# Patient Record
Sex: FEMALE | Race: WHITE | NOT HISPANIC OR LATINO | Employment: OTHER | URBAN - METROPOLITAN AREA
[De-identification: names, ages, dates, MRNs, and addresses within clinical notes are randomized per-mention and may not be internally consistent; named-entity substitution may affect disease eponyms.]

---

## 2017-04-10 ENCOUNTER — TRANSCRIBE ORDERS (OUTPATIENT)
Dept: ADMINISTRATIVE | Facility: HOSPITAL | Age: 67
End: 2017-04-10

## 2017-04-10 DIAGNOSIS — Z12.31 VISIT FOR SCREENING MAMMOGRAM: Primary | ICD-10-CM

## 2017-05-10 ENCOUNTER — HOSPITAL ENCOUNTER (OUTPATIENT)
Dept: MAMMOGRAPHY | Facility: HOSPITAL | Age: 67
Discharge: HOME | End: 2017-05-10
Payer: MEDICARE

## 2017-05-10 DIAGNOSIS — Z12.31 VISIT FOR SCREENING MAMMOGRAM: ICD-10-CM

## 2017-05-10 PROCEDURE — G0202 SCR MAMMO BI INCL CAD: HCPCS

## 2018-04-23 ENCOUNTER — TRANSCRIBE ORDERS (OUTPATIENT)
Dept: ADMINISTRATIVE | Facility: HOSPITAL | Age: 68
End: 2018-04-23

## 2018-04-23 DIAGNOSIS — Z12.39 SCREENING BREAST EXAMINATION: Primary | ICD-10-CM

## 2018-05-14 ENCOUNTER — HOSPITAL ENCOUNTER (OUTPATIENT)
Dept: MAMMOGRAPHY | Facility: HOSPITAL | Age: 68
Discharge: HOME | End: 2018-05-14
Payer: MEDICARE

## 2018-05-14 DIAGNOSIS — Z12.39 SCREENING BREAST EXAMINATION: ICD-10-CM

## 2018-05-14 PROCEDURE — 77067 SCR MAMMO BI INCL CAD: CPT

## 2019-05-01 ENCOUNTER — TRANSCRIBE ORDERS (OUTPATIENT)
Dept: ADMINISTRATIVE | Facility: HOSPITAL | Age: 69
End: 2019-05-01

## 2019-05-01 DIAGNOSIS — Z12.39 BREAST SCREENING, UNSPECIFIED: Primary | ICD-10-CM

## 2019-05-15 ENCOUNTER — HOSPITAL ENCOUNTER (OUTPATIENT)
Dept: MAMMOGRAPHY | Facility: HOSPITAL | Age: 69
Discharge: HOME | End: 2019-05-15
Payer: MEDICARE

## 2019-05-15 VITALS — HEIGHT: 60 IN | WEIGHT: 106 LBS | BODY MASS INDEX: 20.81 KG/M2

## 2019-05-15 DIAGNOSIS — Z12.39 BREAST SCREENING, UNSPECIFIED: ICD-10-CM

## 2019-05-15 PROCEDURE — 77067 SCR MAMMO BI INCL CAD: CPT

## 2019-09-18 ENCOUNTER — OFFICE VISIT (OUTPATIENT)
Dept: PODIATRY | Facility: CLINIC | Age: 69
End: 2019-09-18
Payer: MEDICARE

## 2019-09-18 VITALS
DIASTOLIC BLOOD PRESSURE: 70 MMHG | HEIGHT: 60 IN | BODY MASS INDEX: 21.68 KG/M2 | WEIGHT: 110.4 LBS | SYSTOLIC BLOOD PRESSURE: 112 MMHG

## 2019-09-18 DIAGNOSIS — M79.671 RIGHT FOOT PAIN: ICD-10-CM

## 2019-09-18 DIAGNOSIS — G57.61 LESION OF RIGHT PLANTAR NERVE: Primary | ICD-10-CM

## 2019-09-18 PROCEDURE — 64455 NJX AA&/STRD PLTR COM DG NRV: CPT | Performed by: PODIATRIST

## 2019-09-18 PROCEDURE — 99213 OFFICE O/P EST LOW 20 MIN: CPT | Performed by: PODIATRIST

## 2019-09-18 RX ORDER — LIDOCAINE HYDROCHLORIDE 20 MG/ML
1 INJECTION, SOLUTION INFILTRATION; PERINEURAL ONCE
Status: COMPLETED | OUTPATIENT
Start: 2019-09-18 | End: 2019-09-18

## 2019-09-18 RX ORDER — LEVOTHYROXINE SODIUM 0.07 MG/1
75 TABLET ORAL DAILY
Refills: 11 | COMMUNITY
Start: 2019-08-27 | End: 2021-11-03

## 2019-09-18 RX ORDER — GABAPENTIN 300 MG/1
CAPSULE ORAL 3 TIMES DAILY
COMMUNITY
End: 2019-10-16 | Stop reason: SDUPTHER

## 2019-09-18 RX ORDER — FAMOTIDINE 20 MG/1
20 TABLET, FILM COATED ORAL 2 TIMES DAILY
Refills: 5 | COMMUNITY
Start: 2019-07-30

## 2019-09-18 RX ORDER — TRIAMCINOLONE ACETONIDE 40 MG/ML
20 INJECTION, SUSPENSION INTRA-ARTICULAR; INTRAMUSCULAR ONCE
Status: COMPLETED | OUTPATIENT
Start: 2019-09-18 | End: 2019-09-18

## 2019-09-18 RX ORDER — FLUTICASONE PROPIONATE 50 MCG
SPRAY, SUSPENSION (ML) NASAL
Refills: 5 | COMMUNITY
Start: 2019-09-06

## 2019-09-18 RX ORDER — METHOCARBAMOL 500 MG/1
TABLET, FILM COATED ORAL DAILY
COMMUNITY
Start: 2017-10-25

## 2019-09-18 RX ORDER — MELOXICAM 7.5 MG/1
TABLET ORAL
COMMUNITY
End: 2019-10-16 | Stop reason: SDUPTHER

## 2019-09-18 RX ADMIN — TRIAMCINOLONE ACETONIDE 20 MG: 40 INJECTION, SUSPENSION INTRA-ARTICULAR; INTRAMUSCULAR at 17:14

## 2019-09-18 RX ADMIN — LIDOCAINE HYDROCHLORIDE 1 ML: 20 INJECTION, SOLUTION INFILTRATION; PERINEURAL at 17:12

## 2019-09-18 NOTE — PROGRESS NOTES
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 although Isadore Apo sign is negative  Discussed treatment options  Injected 3rd metatarsal interspace right foot with 0 5 cc Kenalog 40 along with 1 cc 1% xylocaine  Patient is rescheduled in 3 weeks  Nerve block  Date/Time: 9/18/2019 5:18 PM  Performed by: Janey Aviles DPM  Authorized by: Janey Aviles DPM     Patient location:  Clinic  Other Assisting Provider: No    Consent:     Consent obtained:  Verbal    Consent given by:  Patient    Risks discussed:  Unsuccessful block and pain  Universal protocol:     Procedure explained and questions answered to patient or proxy's satisfaction: yes      Patient identity confirmed:  Verbally with patient  Indications:     Indications:  Pain relief  Location:     Body area:  Lower extremity    Lower extremity nerve:  Digital    Laterality:  Right  Pre-procedure details:     Skin preparation:  Alcohol  Procedure details (see MAR for exact dosages): Block needle gauge:  25 G    Anesthetic injected:  Lidocaine 2% w/o epi    Steroid injected:  Triamcinolone    Injection procedure:  Anatomic landmarks identified  Post-procedure details:     Dressing:  None    Outcome:  Pain relieved    Patient tolerance of procedure: Tolerated well, no immediate complications          No problem-specific Assessment & Plan notes found for this encounter  Diagnoses and all orders for this visit:    Lesion of right plantar nerve  -     lidocaine (XYLOCAINE) 2 % injection 1 mL  -     triamcinolone acetonide (KENALOG-40) 40 mg/mL injection 20 mg    Right foot pain    Other orders  -     levothyroxine 75 mcg tablet;  Take 75 mcg by mouth daily  -     meloxicam (MOBIC) 7 5 mg tablet; meloxicam 7 5 mg tablet  -     gabapentin (NEURONTIN) 300 mg capsule; 3 (three) times a day   -     fluticasone (FLONASE) 50 mcg/act nasal spray; SPRAY 2 SPRAYS BY INTRANASAL ROUTE EVERY DAY IN EACH NOSTRIL  - famotidine (PEPCID) 20 mg tablet; Take 20 mg by mouth 2 (two) times a day  -     methocarbamol (ROBAXIN) 500 mg tablet; Take by mouth Daily          Subjective:      Patient ID: Suze Howe is a 78 y o  female  HPI     Patient presents with pain in the ball of the right foot and 4th toe for the past 1 week  Patient states that when he 1st developed the pain was severe  Now it is more comfortable but still is difficult  Pain present only when she walks  She relates a peculiar sensation in the 4th toe  She recalls no trauma  No left foot discomfort related  The following portions of the patient's history were reviewed and updated as appropriate: allergies, current medications, past family history, past medical history, past social history, past surgical history and problem list     Review of Systems   Constitutional: Negative  Cardiovascular: Negative  Gastrointestinal: Negative  Musculoskeletal: Negative  Neurological: Negative  Objective:      /70   Ht 5' (1 524 m)   Wt 50 1 kg (110 lb 6 4 oz)   BMI 21 56 kg/m²          Physical Exam   Cardiovascular: Regular rhythm and intact distal pulses  Musculoskeletal: She exhibits tenderness  Pain with palpation 3rd metatarsal interspace right foot and 4th metatarsophalangeal joint right foot  Patient can raise on to tip toes without difficulty  Mild discomfort with palpation of the 4th ray  Neurological: No sensory deficit  She exhibits abnormal muscle tone  Radha sign negative at the 3rd metatarsal interspace right foot   Skin: Skin is warm  No rash noted  No erythema

## 2019-10-14 ENCOUNTER — TELEPHONE (OUTPATIENT)
Dept: PODIATRY | Facility: CLINIC | Age: 69
End: 2019-10-14

## 2019-10-14 NOTE — TELEPHONE ENCOUNTER
I received a call from [facility] requesting the treatment notes from [facility] 9/18/19 appointment      I faxed them to [phone]

## 2019-10-16 ENCOUNTER — OFFICE VISIT (OUTPATIENT)
Dept: PODIATRY | Facility: CLINIC | Age: 69
End: 2019-10-16
Payer: MEDICARE

## 2019-10-16 VITALS
HEART RATE: 75 BPM | WEIGHT: 104 LBS | HEIGHT: 60 IN | SYSTOLIC BLOOD PRESSURE: 113 MMHG | BODY MASS INDEX: 20.42 KG/M2 | DIASTOLIC BLOOD PRESSURE: 71 MMHG

## 2019-10-16 DIAGNOSIS — G57.61 LESION OF RIGHT PLANTAR NERVE: Primary | ICD-10-CM

## 2019-10-16 PROCEDURE — 99212 OFFICE O/P EST SF 10 MIN: CPT | Performed by: PODIATRIST

## 2019-10-16 RX ORDER — GABAPENTIN 100 MG/1
CAPSULE ORAL 3 TIMES DAILY
Refills: 2 | COMMUNITY
Start: 2019-09-26

## 2019-10-16 RX ORDER — MELOXICAM 15 MG/1
15 TABLET ORAL DAILY
Refills: 2 | COMMUNITY
Start: 2019-10-05

## 2019-10-16 NOTE — PROGRESS NOTES
Patient presents for assessment of right foot  Patient responded well to cortisone injection at 3rd metatarsal interspace right foot  She has minimal pain at this time  No additional treatment is needed  Reappoint p r n

## 2020-05-26 ENCOUNTER — TRANSCRIBE ORDERS (OUTPATIENT)
Dept: ADMINISTRATIVE | Facility: HOSPITAL | Age: 70
End: 2020-05-26

## 2020-05-26 DIAGNOSIS — Z12.31 SCREENING MAMMOGRAM, ENCOUNTER FOR: Primary | ICD-10-CM

## 2020-07-01 ENCOUNTER — HOSPITAL ENCOUNTER (OUTPATIENT)
Dept: MAMMOGRAPHY | Facility: MEDICAL CENTER | Age: 70
Discharge: HOME | End: 2020-07-01
Payer: MEDICARE

## 2020-07-01 VITALS — WEIGHT: 95 LBS | HEIGHT: 60 IN | BODY MASS INDEX: 18.65 KG/M2

## 2020-07-01 DIAGNOSIS — Z12.31 SCREENING MAMMOGRAM, ENCOUNTER FOR: ICD-10-CM

## 2020-07-01 PROCEDURE — 77067 SCR MAMMO BI INCL CAD: CPT

## 2020-07-01 PROCEDURE — 77063 BREAST TOMOSYNTHESIS BI: CPT

## 2021-03-10 DIAGNOSIS — Z23 ENCOUNTER FOR IMMUNIZATION: ICD-10-CM

## 2021-05-20 ENCOUNTER — TRANSCRIBE ORDERS (OUTPATIENT)
Dept: ADMINISTRATIVE | Facility: HOSPITAL | Age: 71
End: 2021-05-20

## 2021-05-20 DIAGNOSIS — Z12.31 ENCOUNTER FOR SCREENING MAMMOGRAM FOR MALIGNANT NEOPLASM OF BREAST: Primary | ICD-10-CM

## 2021-07-21 ENCOUNTER — HOSPITAL ENCOUNTER (OUTPATIENT)
Dept: MAMMOGRAPHY | Facility: MEDICAL CENTER | Age: 71
Discharge: HOME | End: 2021-07-21
Payer: MEDICARE

## 2021-07-21 VITALS — HEIGHT: 59 IN | WEIGHT: 95 LBS | BODY MASS INDEX: 19.15 KG/M2

## 2021-07-21 DIAGNOSIS — Z12.31 ENCOUNTER FOR SCREENING MAMMOGRAM FOR MALIGNANT NEOPLASM OF BREAST: ICD-10-CM

## 2021-07-21 PROCEDURE — 77067 SCR MAMMO BI INCL CAD: CPT

## 2021-07-21 PROCEDURE — 77063 BREAST TOMOSYNTHESIS BI: CPT

## 2021-11-03 ENCOUNTER — OFFICE VISIT (OUTPATIENT)
Dept: PODIATRY | Facility: CLINIC | Age: 71
End: 2021-11-03
Payer: MEDICARE

## 2021-11-03 VITALS
BODY MASS INDEX: 20.56 KG/M2 | HEIGHT: 59 IN | WEIGHT: 102 LBS | DIASTOLIC BLOOD PRESSURE: 56 MMHG | SYSTOLIC BLOOD PRESSURE: 114 MMHG

## 2021-11-03 DIAGNOSIS — M79.672 LEFT FOOT PAIN: ICD-10-CM

## 2021-11-03 DIAGNOSIS — L85.2 KERATODERMA PUNCTATA: Primary | ICD-10-CM

## 2021-11-03 PROCEDURE — 99212 OFFICE O/P EST SF 10 MIN: CPT | Performed by: PODIATRIST

## 2021-11-03 RX ORDER — ACETAMINOPHEN 500 MG
TABLET ORAL EVERY 6 HOURS
COMMUNITY
Start: 2021-05-20

## 2021-11-03 RX ORDER — ROSUVASTATIN CALCIUM 5 MG/1
TABLET, COATED ORAL
COMMUNITY
Start: 2021-06-17

## 2021-11-03 RX ORDER — TURMERIC 400 MG
CAPSULE ORAL
COMMUNITY

## 2021-11-03 RX ORDER — LEVOTHYROXINE SODIUM 0.05 MG/1
TABLET ORAL
COMMUNITY
Start: 2021-05-20

## 2021-11-03 RX ORDER — PREDNISONE 10 MG/1
TABLET ORAL
COMMUNITY
Start: 2021-10-27

## 2021-11-03 RX ORDER — AMLODIPINE BESYLATE 2.5 MG/1
2.5 TABLET ORAL DAILY
COMMUNITY
Start: 2021-10-13

## 2021-12-01 ENCOUNTER — OFFICE VISIT (OUTPATIENT)
Dept: PODIATRY | Facility: CLINIC | Age: 71
End: 2021-12-01
Payer: MEDICARE

## 2021-12-01 VITALS
SYSTOLIC BLOOD PRESSURE: 108 MMHG | HEIGHT: 59 IN | BODY MASS INDEX: 20.76 KG/M2 | WEIGHT: 103 LBS | DIASTOLIC BLOOD PRESSURE: 56 MMHG

## 2021-12-01 DIAGNOSIS — M79.672 LEFT FOOT PAIN: ICD-10-CM

## 2021-12-01 DIAGNOSIS — L85.2 KERATODERMA PUNCTATA: Primary | ICD-10-CM

## 2021-12-01 PROCEDURE — 99212 OFFICE O/P EST SF 10 MIN: CPT | Performed by: PODIATRIST

## 2021-12-01 RX ORDER — SALICYLIC ACID 60 MG/G
GEL TOPICAL
COMMUNITY
Start: 2021-09-07

## 2022-05-04 ENCOUNTER — OFFICE VISIT (OUTPATIENT)
Dept: PODIATRY | Facility: CLINIC | Age: 72
End: 2022-05-04
Payer: MEDICARE

## 2022-05-04 VITALS
SYSTOLIC BLOOD PRESSURE: 104 MMHG | HEIGHT: 59 IN | WEIGHT: 103 LBS | DIASTOLIC BLOOD PRESSURE: 60 MMHG | BODY MASS INDEX: 20.76 KG/M2

## 2022-05-04 DIAGNOSIS — M67.471 GANGLION CYST OF RIGHT FOOT: Primary | ICD-10-CM

## 2022-05-04 DIAGNOSIS — M79.671 RIGHT FOOT PAIN: ICD-10-CM

## 2022-05-04 PROCEDURE — 20550 NJX 1 TENDON SHEATH/LIGAMENT: CPT | Performed by: PODIATRIST

## 2022-05-04 PROCEDURE — 99213 OFFICE O/P EST LOW 20 MIN: CPT | Performed by: PODIATRIST

## 2022-05-04 RX ORDER — TRIAMCINOLONE ACETONIDE 40 MG/ML
20 INJECTION, SUSPENSION INTRA-ARTICULAR; INTRAMUSCULAR ONCE
Status: COMPLETED | OUTPATIENT
Start: 2022-05-04 | End: 2022-05-04

## 2022-05-04 RX ORDER — LIDOCAINE HYDROCHLORIDE 10 MG/ML
1 INJECTION, SOLUTION EPIDURAL; INFILTRATION; INTRACAUDAL; PERINEURAL ONCE
Status: COMPLETED | OUTPATIENT
Start: 2022-05-04 | End: 2022-05-04

## 2022-05-04 RX ADMIN — LIDOCAINE HYDROCHLORIDE 1 ML: 10 INJECTION, SOLUTION EPIDURAL; INFILTRATION; INTRACAUDAL; PERINEURAL at 14:42

## 2022-05-04 RX ADMIN — TRIAMCINOLONE ACETONIDE 20 MG: 40 INJECTION, SUSPENSION INTRA-ARTICULAR; INTRAMUSCULAR at 14:42

## 2022-05-04 NOTE — PROGRESS NOTES
Assessment/Plan:    Explained to patient that she is dealing with a ganglion cyst dorsum of the right foot  Recommended aspiration and then cortisone injection  Anesthesia via 3 cc 1% xylocaine to numb about the cyst   Then we were able to aspirate 0 5 cc of gelatinous material consistent with a ganglion  Injected 0 5 cc Kenalog 40 into the cyst to try to lead to resolution  Reappoint 4 weeks  No problem-specific Assessment & Plan notes found for this encounter  Diagnoses and all orders for this visit:    Ganglion cyst of right foot  -     lidocaine (PF) (XYLOCAINE-MPF) 1 % injection 1 mL  -     triamcinolone acetonide (KENALOG-40) 40 mg/mL injection 20 mg    Right foot pain          Subjective:      Patient ID: Alka Loyd is a 67 y o  female  HPI     Patient, a 69-year-old female seen as an emergency  Patient developed significant pain and swelling on the dorsum of the right foot approximately 3 days ago  She recalls no trauma  No left foot discomfort related  The following portions of the patient's history were reviewed and updated as appropriate: allergies, current medications, past family history, past medical history, past social history, past surgical history and problem list     Review of Systems   Gastrointestinal: Negative  Musculoskeletal: Positive for gait problem  Neurological: Negative for numbness  Psychiatric/Behavioral: Negative  Objective:      /60   Ht 4' 11" (1 499 m)   Wt 46 7 kg (103 lb)   BMI 20 80 kg/m²          Physical Exam  Constitutional:       Appearance: Normal appearance  Cardiovascular:      Pulses: Normal pulses  Musculoskeletal:         General: Tenderness and deformity present  Comments: Palpable soft tissue mass present at the 2nd metatarsal cuneiform joint right foot  This is consistent with a ganglion cyst    Skin:     General: Skin is warm  Neurological:      General: No focal deficit present        Mental Status: She is oriented to person, place, and time  Foot injection     Date/Time 5/4/2022 2:50 PM     Performed by  Tevin Mills DPM     Authorized by Tevin Mills DPM      Universal Protocol   Consent: Verbal consent obtained  Risks and benefits: risks, benefits and alternatives were discussed  Consent given by: patient  Patient understanding: patient states understanding of the procedure being performed  Patient identity confirmed: verbally with patient        Local anesthesia used: yes     Anesthesia   Local anesthesia used: yes  Local Anesthetic: lidocaine 1% without epinephrine     Procedure Details   Procedure Notes: Injected 3 cc of 1% xylocaine and 0 5 cc Kenalog 40 to the right foot

## 2022-06-08 ENCOUNTER — OFFICE VISIT (OUTPATIENT)
Dept: PODIATRY | Facility: CLINIC | Age: 72
End: 2022-06-08
Payer: MEDICARE

## 2022-06-08 VITALS
WEIGHT: 103 LBS | HEART RATE: 60 BPM | DIASTOLIC BLOOD PRESSURE: 56 MMHG | BODY MASS INDEX: 20.76 KG/M2 | HEIGHT: 59 IN | SYSTOLIC BLOOD PRESSURE: 114 MMHG

## 2022-06-08 DIAGNOSIS — M67.471 GANGLION CYST OF RIGHT FOOT: Primary | ICD-10-CM

## 2022-06-08 PROCEDURE — 99212 OFFICE O/P EST SF 10 MIN: CPT | Performed by: PODIATRIST

## 2022-06-08 NOTE — PROGRESS NOTES
Patient presents for assessment of right foot  Patient had ganglion aspirated from the dorsum of the foot at the 2nd metatarsocuneiform joint at last visit  Patient is comfortable and there has been no cyst recurrence  No additional treatment needed  Darell p r n

## 2022-09-08 ENCOUNTER — HOSPITAL ENCOUNTER (OUTPATIENT)
Dept: MAMMOGRAPHY | Facility: MEDICAL CENTER | Age: 72
Discharge: HOME | End: 2022-09-08
Payer: MEDICARE

## 2022-09-08 VITALS — BODY MASS INDEX: 20.76 KG/M2 | HEIGHT: 59 IN | WEIGHT: 102.95 LBS

## 2022-09-08 DIAGNOSIS — Z12.31 ENCOUNTER FOR SCREENING MAMMOGRAM FOR MALIGNANT NEOPLASM OF BREAST: ICD-10-CM

## 2022-09-08 PROCEDURE — 77067 SCR MAMMO BI INCL CAD: CPT

## 2022-09-08 PROCEDURE — 77063 BREAST TOMOSYNTHESIS BI: CPT

## 2022-09-14 ENCOUNTER — OFFICE VISIT (OUTPATIENT)
Dept: PODIATRY | Facility: CLINIC | Age: 72
End: 2022-09-14
Payer: MEDICARE

## 2022-09-14 VITALS
HEIGHT: 59 IN | DIASTOLIC BLOOD PRESSURE: 54 MMHG | SYSTOLIC BLOOD PRESSURE: 110 MMHG | BODY MASS INDEX: 20.56 KG/M2 | WEIGHT: 102 LBS

## 2022-09-14 DIAGNOSIS — M79.671 RIGHT FOOT PAIN: ICD-10-CM

## 2022-09-14 DIAGNOSIS — M67.471 GANGLION CYST OF RIGHT FOOT: Primary | ICD-10-CM

## 2022-09-14 PROCEDURE — 20550 NJX 1 TENDON SHEATH/LIGAMENT: CPT | Performed by: PODIATRIST

## 2022-09-14 RX ORDER — LIDOCAINE HYDROCHLORIDE 10 MG/ML
1 INJECTION, SOLUTION EPIDURAL; INFILTRATION; INTRACAUDAL; PERINEURAL ONCE
Status: COMPLETED | OUTPATIENT
Start: 2022-09-14 | End: 2022-09-14

## 2022-09-14 RX ORDER — TRIAMCINOLONE ACETONIDE 40 MG/ML
20 INJECTION, SUSPENSION INTRA-ARTICULAR; INTRAMUSCULAR ONCE
Status: COMPLETED | OUTPATIENT
Start: 2022-09-14 | End: 2022-09-14

## 2022-09-14 RX ADMIN — TRIAMCINOLONE ACETONIDE 20 MG: 40 INJECTION, SUSPENSION INTRA-ARTICULAR; INTRAMUSCULAR at 16:03

## 2022-09-14 RX ADMIN — LIDOCAINE HYDROCHLORIDE 1 ML: 10 INJECTION, SOLUTION EPIDURAL; INFILTRATION; INTRACAUDAL; PERINEURAL at 16:02

## 2022-09-14 NOTE — PROGRESS NOTES
Patient presents with recurrence of pain dorsal right foot at site of prior ganglion cyst   This is at the 1st metatarsal interspace  The cyst has not recurred but patient again having pain in this area that radiates distally  Recommended a 2nd cortisone injection  Last injection was approximately 3 months ago  Treatment:  Injected right foot with 0 5 cc Kenalog 40 along with 1 cc 1% xylocaine  Reappoint 4 weeks  Foot injection     Date/Time 9/14/2022 3:59 PM     Performed by  Cece Campbell DPM     Authorized by Cece Campbell DPM      Universal Protocol   Consent: Verbal consent obtained  Risks and benefits: risks, benefits and alternatives were discussed  Consent given by: patient  Patient understanding: patient states understanding of the procedure being performed  Patient identity confirmed: verbally with patient        Local anesthesia used: yes     Anesthesia   Local anesthesia used: yes  Local Anesthetic: lidocaine 1% without epinephrine     Procedure Details   Procedure Notes: Injected right foot with 0 5 cc Kenalog 40 along with 1 cc 1% xylocaine

## 2022-10-19 ENCOUNTER — OFFICE VISIT (OUTPATIENT)
Dept: PODIATRY | Facility: CLINIC | Age: 72
End: 2022-10-19
Payer: MEDICARE

## 2022-10-19 VITALS
HEIGHT: 59 IN | DIASTOLIC BLOOD PRESSURE: 60 MMHG | SYSTOLIC BLOOD PRESSURE: 110 MMHG | BODY MASS INDEX: 20.76 KG/M2 | WEIGHT: 103 LBS

## 2022-10-19 DIAGNOSIS — M79.671 RIGHT FOOT PAIN: ICD-10-CM

## 2022-10-19 DIAGNOSIS — M67.471 GANGLION CYST OF RIGHT FOOT: Primary | ICD-10-CM

## 2022-10-19 PROCEDURE — 99212 OFFICE O/P EST SF 10 MIN: CPT | Performed by: PODIATRIST

## 2022-10-19 NOTE — PROGRESS NOTES
Patient presents for assessment of her right foot  Patient was given cortisone injection last visit due to pain  This discomfort has resolved and no pain is currently related  There is no evidence of ganglion cyst   Reappoint p r n

## 2022-12-21 ENCOUNTER — EVALUATION (OUTPATIENT)
Dept: PHYSICAL THERAPY | Facility: REHABILITATION | Age: 72
End: 2022-12-21

## 2022-12-21 DIAGNOSIS — R42 VERTIGO: Primary | ICD-10-CM

## 2022-12-21 NOTE — PROGRESS NOTES
PHYSICAL THERAPY EVALUATION     Date: 22  Name: Stanislav Vargas  : 1950  Referring Provider: Teresa Platt DO  AUTHORIZATION:   Insurance: Payor: Sharon Pandey / Plan: MEDICARE A AND B / Product Type: Medicare A & B Fee for Service /     SUBJECTIVE:  HPI: Stanislav Vargas is a 67 y o  female referred to outpatient physical therapy for the following diagnosis No diagnosis found  Patient reports intermittent vertigo with leaning to the right, feels immediate tilt a whirl  Hasn't happened in past two days but it comes and goes  Can happen with bending forwards  Never happens to the left  Sometimes happens if rolling in bed to the right  Feels rolling/spinning  Began 2 months ago at least, no specific illness or injury  No medication  Takes medication for pain among other areas  Denies right-sided ear fullness, feels water in left side  Doesn't go away, has occurred intermittently for a long time  Ringing in both ears, not currently  Sometimes sounds loud  For a few years  Unsure about cause  [place] in yard at night caring for puppy, over the past year, no injury, can trip and fall over anything, no injuries  Patient-Specific Functional Scale   Task is scored 0 (unable to perform activity) to 10 (ability to perform activity independently)  Activity     1   Make sure the right ear is in better shape, get rid of it     2         3           Past Medical History:   Diagnosis Date   • Allergic rhinitis    • Arthritis    • Callus    • Disease of thyroid gland    • Hammer toe    • Hyperthyroidism    • Keratosis    • Metatarsalgia        Current Outpatient Medications:   •  acetaminophen (TYLENOL) 500 mg tablet, Take by mouth every 6 (six) hours, Disp: , Rfl:   •  amLODIPine (NORVASC) 2 5 mg tablet, Take 2 5 mg by mouth daily (Patient not taking: No sig reported), Disp: , Rfl:   •  famotidine (PEPCID) 20 mg tablet, Take 20 mg by mouth 2 (two) times a day, Disp: , Rfl: 5  •  fluticasone (FLONASE) 50 mcg/act nasal spray, SPRAY 2 SPRAYS BY INTRANASAL ROUTE EVERY DAY IN EACH NOSTRIL, Disp: , Rfl: 5  •  gabapentin (NEURONTIN) 100 mg capsule, 3 (three) times a day , Disp: , Rfl: 2  •  levothyroxine 50 mcg tablet, TAKE ONE TABLET BY ORAL ROUTE EVERY DAY, Disp: , Rfl:   •  meloxicam (MOBIC) 15 mg tablet, Take 15 mg by mouth daily Take with food (Patient not taking: No sig reported), Disp: , Rfl: 2  •  methocarbamol (ROBAXIN) 500 mg tablet, Take by mouth Daily (Patient not taking: No sig reported), Disp: , Rfl:   •  predniSONE 10 mg tablet, Take 4 tabs PO qd x 2 days, 3 tabs qd x 2 days, 2 tabs qd x 2 days, 1 tab qd x 2 days (Patient not taking: No sig reported), Disp: , Rfl:   •  rosuvastatin (CRESTOR) 5 mg tablet, take 1 tablet by oral route  MW, Disp: , Rfl:   •  salicylic acid 6 % gel, APPLY TWICE DAILY TO THE THICKENED AREAS OF THE SKIN ON ELBOWS AND KNEES, Disp: , Rfl:   •  Turmeric 400 MG CAPS, Take by mouth, Disp: , Rfl:     OBJECTIVE:   Vitals     Blood pressure (resting, left arm unless noted) 104/60    Heart rate (resting) 68 bpm       Oculomotor & Coordination     Smooth pursuit & conjugate gaze Normal; no resting nor gaze holding nystagmus    Fingertip to nose & rapidly alternating hand movements Normal   Neck ROM within functional limits  Static Balance     Romberg Normal      Functional Gait Assessment  2/3 Gait level surface  2/3 Change in gait speed  2/3 Gait with horizontal head turns  1/3 Gait with vertical head turns  3/3 Gait and pivot turn  3/3 Step over obstacle  0/3 Gait with narrow base of support  1/3 Gait with eyes closed  2/3 Ambulating backwards  1/3 Steps - Patient described, due to right hip bursitis, had injection with temporary relief last week  17/30 Total score (less than 22/30 indicates increased risk of fall)         Mobility Measures 12/21/22   5 Time Sit to Stand  (17" chair, arms across chest) 15 3 seconds   3 Meter Timed  Up & Go 9 0 seconds   Walking speed (self-selected) Not tested   Functional Gait Assessment (see below) 17/30   6 Minute Walk Test (100 foot circular course) Not tested   Patient-Reported Outcome Measure: Activities-Specific Balance Confidence Scale (ABC Scale) 85%     Dynamic Visual Acuity (uses ETDRS chart, head movements 2Hz):    Static Head 20/50 no glasses; 20/40 with    Dynamic Head Use 20/200   Difference (abnormal if 3 or greater) At least 7 line     (+) head thrust right and left    History of sacrum and arm fracture  Core Movement Tasks Description of task    Sitting Normal   Sitting to Standing Normal   Standing Normal   Walking Normal gait   Step-up Not observed   Reach, grasp, manipulate Within functional limits  ASSESSMENT:  Mathieu Hernandez is a 67year old female with vertigo, most consistent with likely right posterior canal BPPV as well as bilateral peripheral hypofunction  We were not able to elicit BPPV today during the examination  It does seem that likely bilateral vestibular hypofunction contributes to history of falling  SHORT-TERM GOALS: by 1/20/23  1  Patient denies symptoms consistent with positional vertigo and continues to demonstrate normal Cristiana-Hallpike and Roll Tests bilaterally  2  Patient demonstrates at most 6 line difference with dynamic visual acuity testing  3  Patient improves FGA to at least 20/30  LONG-TERM GOALS: by 2/20/23  1  Patient increases FGA to 22/30   2  Patient denies vertigo with return to all prior movements      Precautions - none    Specialty Treatment Diary  12/21/22     Home exercise program Seated VOR x 1 horizontal  15 sec x 4 calibrating on "retinal slip"  Walking horizontal head movements in bernardo at home, 15 sec x 4     (Ther-ex, neuromuscular re-ed)Walking/endurance      (Neuromuscular re-ed) Static and dynamic balance/anticipatory / reactive      (Ther-ex) Strengthening      (There-ex) Stretching              PLAN OF CARE:  Patient will benefit from physical therapy 1 times per week for 2 months  Neuromuscular re-education, therapeutic exercises, and therapeutic activities as outlined in grids      Desmond Plata, PT  12/21/2022 Meals and Snack Collaboration and Referral of Nutrition Care/Nutrition Education

## 2022-12-21 NOTE — LETTER
2022    Nolvia Castellano, [address] [facility]    Patient: Afua Madrigal   YOB: 1950   Date of Visit: 2022     Encounter Diagnosis     ICD-10-CM    1  Ashley Royal           Dear Dr Tivis Gilford: Thank you for your recent referral of Afua Madrigal  Please review the attached evaluation summary from Nishi's recent visit  Please verify that you agree with the plan of care by signing the attached order  If you have any questions or concerns, please do not hesitate to call  I sincerely appreciate the opportunity to share in the care of one of your patients and hope to have another opportunity to work with you in the near future  Sincerely,    Fransisca Ley, PT      Referring Provider:      I certify that I have read the below Plan of Care and certify the need for these services furnished under this plan of treatment while under my care  Nolvia Castellano DO  [address] [facility]  Via Fax: [phone]          PHYSICAL THERAPY EVALUATION     Date: 22  Name: Afua Madrigal  : 1950  Referring Provider: Meghan Mendez DO  AUTHORIZATION:   Insurance: Payor: MEDICARE / Plan: MEDICARE A AND B / Product Type: Medicare A & B Fee for Service /     SUBJECTIVE:  HPI: Afua Madrigal is a 67 y o  female referred to outpatient physical therapy for the following diagnosis No diagnosis found  Patient reports intermittent vertigo with leaning to the right, feels immediate tilt a whirl  Hasn't happened in past two days but it comes and goes  Can happen with bending forwards  Never happens to the left  Sometimes happens if rolling in bed to the right  Feels rolling/spinning  Began 2 months ago at least, no specific illness or injury  No medication  Takes medication for pain among other areas  Denies right-sided ear fullness, feels water in left side  Doesn't go away, has occurred intermittently for a long time  Ringing in both ears, not currently  Sometimes sounds loud  For a few years  Unsure about cause  [place] in yard at night caring for puppy, over the past year, no injury, can trip and fall over anything, no injuries  Patient-Specific Functional Scale   Task is scored 0 (unable to perform activity) to 10 (ability to perform activity independently)  Activity     1   Make sure the right ear is in better shape, get rid of it     2         3           Past Medical History:   Diagnosis Date   • Allergic rhinitis 1990   • Arthritis    • Callus 2020   • Disease of thyroid gland 1967   • Hammer toe 2000   • Hyperthyroidism    • Keratosis    • Metatarsalgia        Current Outpatient Medications:   •  acetaminophen (TYLENOL) 500 mg tablet, Take by mouth every 6 (six) hours, Disp: , Rfl:   •  amLODIPine (NORVASC) 2 5 mg tablet, Take 2 5 mg by mouth daily (Patient not taking: No sig reported), Disp: , Rfl:   •  famotidine (PEPCID) 20 mg tablet, Take 20 mg by mouth 2 (two) times a day, Disp: , Rfl: 5  •  fluticasone (FLONASE) 50 mcg/act nasal spray, SPRAY 2 SPRAYS BY INTRANASAL ROUTE EVERY DAY IN EACH NOSTRIL, Disp: , Rfl: 5  •  gabapentin (NEURONTIN) 100 mg capsule, 3 (three) times a day , Disp: , Rfl: 2  •  levothyroxine 50 mcg tablet, TAKE ONE TABLET BY ORAL ROUTE EVERY DAY, Disp: , Rfl:   •  meloxicam (MOBIC) 15 mg tablet, Take 15 mg by mouth daily Take with food (Patient not taking: No sig reported), Disp: , Rfl: 2  •  methocarbamol (ROBAXIN) 500 mg tablet, Take by mouth Daily (Patient not taking: No sig reported), Disp: , Rfl:   •  predniSONE 10 mg tablet, Take 4 tabs PO qd x 2 days, 3 tabs qd x 2 days, 2 tabs qd x 2 days, 1 tab qd x 2 days (Patient not taking: No sig reported), Disp: , Rfl:   •  rosuvastatin (CRESTOR) 5 mg tablet, take 1 tablet by oral route  MWFS, Disp: , Rfl:   •  salicylic acid 6 % gel, APPLY TWICE DAILY TO THE THICKENED AREAS OF THE SKIN ON ELBOWS AND KNEES, Disp: , Rfl:   •  Turmeric 400 MG CAPS, Take by mouth, Disp: , Rfl:     OBJECTIVE:   Vitals     Blood pressure (resting, left arm unless noted) 104/60    Heart rate (resting) 68 bpm       Oculomotor & Coordination     Smooth pursuit & conjugate gaze Normal; no resting nor gaze holding nystagmus    Fingertip to nose & rapidly alternating hand movements Normal   Neck ROM within functional limits  Static Balance     Romberg Normal      Functional Gait Assessment  2/3 Gait level surface  2/3 Change in gait speed  2/3 Gait with horizontal head turns  1/3 Gait with vertical head turns  3/3 Gait and pivot turn  3/3 Step over obstacle  0/3 Gait with narrow base of support  1/3 Gait with eyes closed  2/3 Ambulating backwards  1/3 Steps - Patient described, due to right hip bursitis, had injection with temporary relief last week  17/30 Total score (less than 22/30 indicates increased risk of fall)         Mobility Measures 12/21/22   5 Time Sit to Stand  (17" chair, arms across chest) 15 3 seconds   3 Meter Timed  Up & Go 9 0 seconds   Walking speed (self-selected) Not tested   Functional Gait Assessment (see below) 17/30   6 Minute Walk Test (100 foot circular course) Not tested   Patient-Reported Outcome Measure: Activities-Specific Balance Confidence Scale (ABC Scale) 85%     Dynamic Visual Acuity (uses ETDRS chart, head movements 2Hz):    Static Head 20/50 no glasses; 20/40 with    Dynamic Head Use 20/200   Difference (abnormal if 3 or greater) At least 7 line     (+) head thrust right and left    History of sacrum and arm fracture  Core Movement Tasks Description of task    Sitting Normal   Sitting to Standing Normal   Standing Normal   Walking Normal gait   Step-up Not observed   Reach, grasp, manipulate Within functional limits       ASSESSMENT:  Terell Cagle is a 67year old female with vertigo, most consistent with likely right posterior canal BPPV as well as bilateral peripheral hypofunction  We were not able to elicit BPPV today during the examination  It does seem that likely bilateral vestibular hypofunction contributes to history of falling  SHORT-TERM GOALS: by 1/20/23  1  Patient denies symptoms consistent with positional vertigo and continues to demonstrate normal [place]-Hallpike and Roll Tests bilaterally  2  Patient demonstrates at most 6 line difference with dynamic visual acuity testing  3  Patient improves FGA to at least 20/30  LONG-TERM GOALS: by 2/20/23  1  Patient increases FGA to 22/30   2  Patient denies vertigo with return to all prior movements  Precautions - none    Specialty Treatment Diary  12/21/22     Home exercise program Seated VOR x 1 horizontal  15 sec x 4 calibrating on "retinal slip"  Walking horizontal head movements in bernardo at home, 15 sec x 4     (Ther-ex, neuromuscular re-ed)Walking/endurance      (Neuromuscular re-ed) Static and dynamic balance/anticipatory / reactive      (Ther-ex) Strengthening      (There-ex) Stretching              PLAN OF CARE:  Patient will benefit from physical therapy 1 times per week for 2 months  Neuromuscular re-education, therapeutic exercises, and therapeutic activities as outlined in grids      Emma Bray, PT  12/21/2022

## 2022-12-29 ENCOUNTER — OFFICE VISIT (OUTPATIENT)
Dept: PHYSICAL THERAPY | Facility: REHABILITATION | Age: 72
End: 2022-12-29

## 2022-12-29 DIAGNOSIS — R42 VERTIGO: Primary | ICD-10-CM

## 2022-12-29 NOTE — PROGRESS NOTES
Daily Note     Today's date: 2022  Patient name: Roger Javier  : 1950  MRN: [id number]  Referring provider: Judy Coyle DO  Dx:   Encounter Diagnosis     ICD-10-CM    1  Vertigo  R42                      Subjective: Reports compliance with HEP  Continues with dizziness, not spinning, more balance lightheaded dizziness  Objective: See treatment diary below      Assessment: Initiated POC addressing deficits found on eval   SOLO utilized for safety and fall prevention due to balance deficits and this being pt's first session  Tolerated treatment well, no nausea or headaches, increased dizziness but able to work through  Issued and reviewed HEP, pt communicated understanding  Patient would benefit from continued PT      Plan: Continues with vestibular exercises from today increasing times with VORx1 as pt able        Precautions - none  Manuals                                                                                         Neuro Re-Ed                 Vestibular edu functions of vestibular system                VORx1, H/V standing Plain 2x30s ea                Walking with HTs, H/V Fwd 4x20'                VORcx, H/V standing With gait  4x20' ea                Walking with 360 turns Fwd EO 4x20'                Foam EC FT 3x30s' FA HTs H/V 2x30s ea                Tandem gait EO arms crossed 4x20'                Bwd with EC 4x20'                Ther Ex                                                                                                                                                                 Ther Activity                                                     Gait Training                                                     Modalities

## 2023-01-05 ENCOUNTER — OFFICE VISIT (OUTPATIENT)
Dept: PHYSICAL THERAPY | Facility: REHABILITATION | Age: 73
End: 2023-01-05

## 2023-01-05 DIAGNOSIS — R42 VERTIGO: Primary | ICD-10-CM

## 2023-01-05 NOTE — PROGRESS NOTES
TREATMENT NOTE    Today's date: 2023  Patient name: Maritza Mendez  : 1950  MRN: [id number]  Referring provider: Darrell Bedolla DO  Dx:   Encounter Diagnosis     ICD-10-CM    1  Vertigo  R42            Subjective:   Doing exercises, does get symptomatic with semi-tandem eyes closed, can continue if resting in between exercises  Objective: See treatment diary below    Functional Gait Assessment  2/3 Gait level surface  3/3 Change in gait speed  2/3 Gait with horizontal head turns  1/3 Gait with vertical head turns  1/3 Gait and pivot turn  3/3 Step over obstacle  1/3 Gait with narrow base of support  2/3 Gait with eyes closed  2/3 Ambulating backwards  1/3 Steps  18/30 Total score (less than 22/30 indicates increased risk of fall)         Assessment: We worked in the [id number] to safely challenge balance, and were able to challenge higher level dynamic balance today  Reviewed prior written static exercises, continue to work to tolerance and move to dynamic exercises as able and safe  We can always progress exercises to the level that they are challenging, but the point is working to what is necessary for normal mobility        Plan:     Precautions - none  Manuals 23                           Neuro Re-Ed     Vestibular edu functions of vestibular system Walking head movements:  1 min horizontal  1 min vertical  1 min diagonal x 2 sets    Walking catching/passing ball 1 min x 2 sets    Tandem walk 1 min  Step onto foam incline, head movements horizontal, off, 1 min  Then same EC on the foam with the head movements, 2 min  Lateral step to foam incline, 1 min each x 2 sets  Walking 360 degree turns 1 min    Standing on foam incline, catch bean bags, 1 min       VORx1, H/V standing Plain 2x30s ea 20 sec x 2 sets feet apart, together  Then semi-tandem 20 sec x 3 sets    Then on foam feet apart 30 sec x 3 sets  EC foam 30 sec x 2 sets   Walking with HTs, H/V Fwd 4x20'    VORcx, H/V standing With gait  4x20' ea    Walking with 360 turns Fwd EO 4x20'    Foam EC FT 3x30s' FA HTs H/V 2x30s ea    Tandem gait EO arms crossed 4x20'    Bwd with EC 4x20'    Ther Ex                                                     Ther Activity                 Gait Training                 Modalities

## 2023-01-12 ENCOUNTER — OFFICE VISIT (OUTPATIENT)
Dept: PHYSICAL THERAPY | Facility: REHABILITATION | Age: 73
End: 2023-01-12

## 2023-01-12 DIAGNOSIS — R42 VERTIGO: Primary | ICD-10-CM

## 2023-01-12 NOTE — PROGRESS NOTES
TREATMENT NOTE    Today's date: 2023  Patient name: Peri Negron  : 1950  MRN: [id number]  Referring provider: Kayla Duron DO  Dx:   Encounter Diagnosis     ICD-10-CM    1  Vertigo  R42            Subjective:   Been doing okay moving around, has been to busy to get to exercises   on Dizziness Handicap Inventory, reports imbalance with looking up  Objective: See treatment diary below    Functional Gait Assessment  3/3 Gait level surface  3/3 Change in gait speed  3/3 Gait with horizontal head turns  2/3 Gait with vertical head turns  3/3 Gait and pivot turn    Step over obstacle -not tested   Gait with narrow base of support - not tested  2/3 Gait with eyes closed  2/3 Ambulating backwards  /3 Steps - not tested   Total score (less than 22/30 indicates increased risk of fall)         Assessment: We worked in the [employer] to safely challenge balance, and were able to challenge higher level dynamic balance today  Completed aspects of FGA, scored higher on these items than previously, but didn't complete all items so cannot give overall score  Progress home exercises in intensity and amplitude as able  Addressed neck stiffness that affects willingness to move the neck quickly and capacity to complete exercises requiring lots of neck movement        Plan:     Precautions - none  Manuals 23                               Neuro Re-Ed      Vestibular edu functions of vestibular system Walking head movements:  1 min horizontal  1 min vertical  1 min diagonal x 2 sets    Walking catching/passing ball 1 min x 2 sets    Tandem walk 1 min  Step onto foam incline, head movements horizontal, off, 1 min  Then same EC on the foam with the head movements, 2 min  Lateral step to foam incline, 1 min each x 2 sets  Walking 360 degree turns 1 min    Standing on foam incline, catch bean bags, 1 min     Static balance  VOR x 1 horizontal with progressively decreasing base of support to semi-tandem, 30 sec x 5  Vertical, same parameters, 30 sec x 2 sets  AP weight shift, 1 min, then 2nd and 3rd sets 1 min x 2 sets eyes closed  Standing on foam, feet together, eyes closed, 1 min x 2 sets    Walk and turn 180 - 360 degrees in Solostep, 3 min    Step up and back foam incline, 1 min x 2  Same eyes closed 1 min    Supine neck tuck gentle traction, trigger point right upper trap and left neck sidebending stretch, 5 min   VORx1, H/V standing Plain 2x30s ea 20 sec x 2 sets feet apart, together  Then semi-tandem 20 sec x 3 sets    Then on foam feet apart 30 sec x 3 sets  EC foam 30 sec x 2 sets    Walking with HTs, H/V Fwd 4x20'     VORcx, H/V standing With gait  4x20' ea     Walking with 360 turns Fwd EO 4x20'     Foam EC FT 3x30s' FA HTs H/V 2x30s ea     Tandem gait EO arms crossed 4x20'     Bwd with EC 4x20'     Ther Ex                                                              Ther Activity                    Gait Training                    Modalities

## 2023-01-19 ENCOUNTER — OFFICE VISIT (OUTPATIENT)
Dept: PHYSICAL THERAPY | Facility: REHABILITATION | Age: 73
End: 2023-01-19

## 2023-01-19 DIAGNOSIS — R42 VERTIGO: Primary | ICD-10-CM

## 2023-01-19 NOTE — PROGRESS NOTES
TREATMENT NOTE    Today's date: 2023  Patient name: Brown Habermann  : 1950  MRN: [id number]  Referring provider: Abdelrahman Vogel DO  Dx:   Encounter Diagnosis     ICD-10-CM    1  Vertigo  R42            Subjective:   Doing well, less symptoms, only symptoms with looking up for prolonged time  Objective: See treatment diary below    Functional Gait Assessment  2/3 Gait level surface  3/3 Change in gait speed  3/3 Gait with horizontal head turns  2/3 Gait with vertical head turns  3/3 Gait and pivot turn  3/3 Step over obstacle  0/3 Gait with narrow base of support  3/3 Gait with eyes closed  2/3 Ambulating backwards  2/3 Steps  23/30 Total score (less than 22/30 indicates increased risk of fall)       Assessment:   Improved dynamic balance from baseline  Improved motion tolerance  Moderated exercise to reach target of mild-moderate symptoms, via changing amplitude or speed or duration of movement  Addressed neck stiffness that affects willingness to move the neck quickly and capacity to complete exercises requiring lots of neck movement  Progressed verbal home exercise program   Follow in 2 weeks      Plan:   see poc    Precautions - none  Manuals 23                                   Neuro Re-Ed       Vestibular edu functions of vestibular system Walking head movements:  1 min horizontal  1 min vertical  1 min diagonal x 2 sets    Walking catching/passing ball 1 min x 2 sets    Tandem walk 1 min  Step onto foam incline, head movements horizontal, off, 1 min  Then same EC on the foam with the head movements, 2 min  Lateral step to foam incline, 1 min each x 2 sets  Walking 360 degree turns 1 min    Standing on foam incline, catch bean bags, 1 min     Static balance  VOR x 1 horizontal with progressively decreasing base of support to semi-tandem, 30 sec x 5  Vertical, same parameters, 30 sec x 2 sets  AP weight shift, 1 min, then 2nd and 3rd sets 1 min x 2 sets eyes closed  Standing on foam, feet together, eyes closed, 1 min x 2 sets    Walk and turn 180 - 360 degrees in Solostep, 3 min    Step up and back foam incline, 1 min x 2  Same eyes closed 1 min    Supine neck tuck gentle traction, trigger point right upper trap and left neck sidebending stretch, 5 min FGA    Walking vertical and diagonal head movements  400 feet    Walking with tossing, catching ball  20 feet x 8    Standing AP weight shift EC 1 min x 2  Looking up EC, 30 sec x 3  On foam 30 sec x 3  Semi-tandem VOR x 1 horizontal 20 sec x 3-4    Supine neck chin tuck stretch 2 min x 2           VORx1, H/V standing Plain 2x30s ea 20 sec x 2 sets feet apart, together  Then semi-tandem 20 sec x 3 sets    Then on foam feet apart 30 sec x 3 sets  EC foam 30 sec x 2 sets     Walking with HTs, H/V Fwd 4x20'      VORcx, H/V standing With gait  4x20' ea      Walking with 360 turns Fwd EO 4x20'      Foam EC FT 3x30s' FA HTs H/V 2x30s ea      Tandem gait EO arms crossed 4x20'      Bwd with EC 4x20'      Ther Ex                                                                       Ther Activity                       Gait Training                       Modalities

## 2023-01-26 ENCOUNTER — APPOINTMENT (OUTPATIENT)
Dept: PHYSICAL THERAPY | Facility: REHABILITATION | Age: 73
End: 2023-01-26

## 2023-02-02 ENCOUNTER — OFFICE VISIT (OUTPATIENT)
Dept: PHYSICAL THERAPY | Facility: REHABILITATION | Age: 73
End: 2023-02-02

## 2023-02-02 DIAGNOSIS — R42 VERTIGO: Primary | ICD-10-CM

## 2023-02-02 NOTE — LETTER
2023    Dois Mar,  [facility]    Patient: Roger Javier   YOB: 1950   Date of Visit: 2023     Encounter Diagnosis     ICD-10-CM    1  Diana Pickett           Dear Dr Soraida Juan: Thank you for your recent referral of Roger Javier  Please review the attached evaluation summary from Nishi's recent visit  Please verify that you agree with the plan of care by signing the attached order  If you have any questions or concerns, please do not hesitate to call  I sincerely appreciate the opportunity to share in the care of one of your patients and hope to have another opportunity to work with you in the near future  Sincerely,    Reynaldo Bella, PT      Referring Provider:      I certify that I have read the below Plan of Care and certify the need for these services furnished under this plan of treatment while under my care  Stanislaw Payton DO  [address]  [place]  [place]  Via Fax: [phone]          TREATMENT NOTE    Today's date: 2023  Patient name: Roger Javier  : 1950  MRN: [id number]  Referring provider: Judy Coyle DO  Dx:   Encounter Diagnosis     ICD-10-CM    1  Vertigo  R42          Subjective:   Doing well, better balance, only symptoms with looking upwards  Objective: See treatment diary below    Functional Gait Assessment  2/3 Gait level surface  3/3 Change in gait speed  3/3 Gait with horizontal head turns  3/3 Gait with vertical head turns  3/3 Gait and pivot turn  2/3 Step over obstacle  2/3 Gait with narrow base of support  3/3 Gait with eyes closed  2/3 Ambulating backwards  2/3 Steps  25/30 Total score (less than 22/30 indicates increased risk of fall)       (-) Cristiana-Hallpike bilaterally  (-) Roll Test bilaterally    10% on Dizziness Handicap Inventory (16+ is mild)  94% on Activities Specific Balance Confidence Scale    Assessment:   Improved dynamic balance from baseline  Improved motion tolerance  Moderated exercise to reach target of mild-moderate symptoms, via changing amplitude or speed or duration of movement  Addressed neck stiffness that affects willingness to move the neck quickly and capacity to complete exercises requiring lots of neck movement  Progressed verbal home exercise program   Follow in 2 weeks      Plan:  see poc    Precautions - none  Manuals 12/29 1/05/23 1/12/23 1/19/23 2/03/23                                       Neuro Re-Ed        Vestibular edu functions of vestibular system Walking head movements:  1 min horizontal  1 min vertical  1 min diagonal x 2 sets    Walking catching/passing ball 1 min x 2 sets    Tandem walk 1 min  Step onto foam incline, head movements horizontal, off, 1 min  Then same EC on the foam with the head movements, 2 min  Lateral step to foam incline, 1 min each x 2 sets  Walking 360 degree turns 1 min    Standing on foam incline, catch bean bags, 1 min     Static balance  VOR x 1 horizontal with progressively decreasing base of support to semi-tandem, 30 sec x 5  Vertical, same parameters, 30 sec x 2 sets  AP weight shift, 1 min, then 2nd and 3rd sets 1 min x 2 sets eyes closed  Standing on foam, feet together, eyes closed, 1 min x 2 sets    Walk and turn 180 - 360 degrees in Solostep, 3 min    Step up and back foam incline, 1 min x 2  Same eyes closed 1 min    Supine neck tuck gentle traction, trigger point right upper trap and left neck sidebending stretch, 5 min FGA    Walking vertical and diagonal head movements  400 feet    Walking with tossing, catching ball  20 feet x 8    Standing AP weight shift EC 1 min x 2  Looking up EC, 30 sec x 3  On foam 30 sec x 3  Semi-tandem VOR x 1 horizontal 20 sec x 3-4    Supine neck chin tuck stretch 2 min x 2         See above      Standing on foam incline, looking up 5 sec, backwards off, 2 min  Same with tossing catching ball, looking up, 2 min  Same with eyes closed on incline, 2-3 sec, backwards off, 2 min    Retrieve bean bag from floor, turn and toss, about 15' radius, about 15 bags  Repeated stepping and retrieving from airex foam, about 15         VORx1, H/V standing Plain 2x30s ea 20 sec x 2 sets feet apart, together  Then semi-tandem 20 sec x 3 sets    Then on foam feet apart 30 sec x 3 sets  EC foam 30 sec x 2 sets      Walking with HTs, H/V Fwd 4x20'       VORcx, H/V standing With gait  4x20' ea       Walking with 360 turns Fwd EO 4x20'       Foam EC FT 3x30s' FA HTs H/V 2x30s ea       Tandem gait EO arms crossed 4x20'       Bwd with EC 4x20'       Ther Ex                                                                                Ther Activity                          Gait Training                          Modalities                                    ASSESSMENT:  Isabel Laughlin is a 67year old female with vertigo, most consistent with likely right posterior canal BPPV as well as bilateral peripheral hypofunction  BPPV resolved  She is better able to maintain static and dynamic balance and has returned to prior activities, as well as has the tools to further improve in capacity for looking up at the night saul on a sloped hill, and other situations that require static and dynamic balance  SHORT-TERM GOALS: by 1/20/23  1  Patient denies symptoms consistent with positional vertigo and continues to demonstrate normal [place]-Hallpike and Roll Tests bilaterally  MET  2  Patient demonstrates at most 6 line difference with dynamic visual acuity testing  NOT TESTED  3  Patient improves FGA to at least 20/30  MET  LONG-TERM GOALS: by 2/20/23  1  Patient increases FGA to 22/30  MET  2  Patient denies vertigo with return to all prior movements  MET      Precautions - none    Specialty Treatment Diary  12/21/22     Home exercise program Seated VOR x 1 horizontal  15 sec x 4 calibrating on "retinal slip"  Walking horizontal head movements in bernardo at home, 15 sec x 4     (Ther-ex, neuromuscular re-ed)Walking/endurance      (Neuromuscular re-ed) Static and dynamic balance/anticipatory / reactive      (Ther-ex) Strengthening      (There-ex) Stretching              PLAN OF CARE: Discharge from physical therapy

## 2023-02-02 NOTE — PROGRESS NOTES
TREATMENT NOTE    Today's date: 2023  Patient name: Wong Garrido  : 1950  MRN: [id number]  Referring provider: Debora Ramires DO  Dx:   Encounter Diagnosis     ICD-10-CM    1  Vertigo  R42          Subjective:   Doing well, better balance, only symptoms with looking upwards  Objective: See treatment diary below    Functional Gait Assessment  2/3 Gait level surface  3/3 Change in gait speed  3/3 Gait with horizontal head turns  3/3 Gait with vertical head turns  3/3 Gait and pivot turn  2/3 Step over obstacle  2/3 Gait with narrow base of support  3/3 Gait with eyes closed  2/3 Ambulating backwards  2/3 Steps  25/30 Total score (less than 22/30 indicates increased risk of fall)       (-) [place]-Hallpike bilaterally  (-) Roll Test bilaterally    10% on Dizziness Handicap Inventory (16+ is mild)  94% on Activities Specific Balance Confidence Scale    Assessment:   Improved dynamic balance from baseline  Improved motion tolerance  Moderated exercise to reach target of mild-moderate symptoms, via changing amplitude or speed or duration of movement  Addressed neck stiffness that affects willingness to move the neck quickly and capacity to complete exercises requiring lots of neck movement  Progressed verbal home exercise program   Follow in 2 weeks      Plan:   see poc    Precautions - none  Manuals 23                                       Neuro Re-Ed        Vestibular edu functions of vestibular system Walking head movements:  1 min horizontal  1 min vertical  1 min diagonal x 2 sets    Walking catching/passing ball 1 min x 2 sets    Tandem walk 1 min  Step onto foam incline, head movements horizontal, off, 1 min  Then same EC on the foam with the head movements, 2 min  Lateral step to foam incline, 1 min each x 2 sets  Walking 360 degree turns 1 min    Standing on foam incline, catch bean bags, 1 min     Static balance  VOR x 1 horizontal with progressively decreasing base of support to semi-tandem, 30 sec x 5  Vertical, same parameters, 30 sec x 2 sets  AP weight shift, 1 min, then 2nd and 3rd sets 1 min x 2 sets eyes closed  Standing on foam, feet together, eyes closed, 1 min x 2 sets    Walk and turn 180 - 360 degrees in Solostep, 3 min    Step up and back foam incline, 1 min x 2  Same eyes closed 1 min    Supine neck tuck gentle traction, trigger point right upper trap and left neck sidebending stretch, 5 min FGA    Walking vertical and diagonal head movements  400 feet    Walking with tossing, catching ball  20 feet x 8    Standing AP weight shift EC 1 min x 2  Looking up EC, 30 sec x 3  On foam 30 sec x 3  Semi-tandem VOR x 1 horizontal 20 sec x 3-4    Supine neck chin tuck stretch 2 min x 2         See above      Standing on foam incline, looking up 5 sec, backwards off, 2 min  Same with tossing catching ball, looking up, 2 min  Same with eyes closed on incline, 2-3 sec, backwards off, 2 min    Retrieve bean bag from floor, turn and toss, about 15' radius, about 15 bags  Repeated stepping and retrieving from airex foam, about 15         VORx1, H/V standing Plain 2x30s ea 20 sec x 2 sets feet apart, together  Then semi-tandem 20 sec x 3 sets    Then on foam feet apart 30 sec x 3 sets  EC foam 30 sec x 2 sets      Walking with HTs, H/V Fwd 4x20'       VORcx, H/V standing With gait  4x20' ea       Walking with 360 turns Fwd EO 4x20'       Foam EC FT 3x30s' FA HTs H/V 2x30s ea       Tandem gait EO arms crossed 4x20'       Bwd with EC 4x20'       Ther Ex                                                                                Ther Activity                          Gait Training                          Modalities                                    ASSESSMENT:  Robby Ram is a 67year old female with vertigo, most consistent with likely right posterior canal BPPV as well as bilateral peripheral hypofunction  BPPV resolved    She is better able to maintain static and dynamic balance and has returned to prior activities, as well as has the tools to further improve in capacity for looking up at the night saul on a sloped hill, and other situations that require static and dynamic balance  SHORT-TERM GOALS: by 1/20/23  1  Patient denies symptoms consistent with positional vertigo and continues to demonstrate normal Cristiana-Hallpike and Roll Tests bilaterally  MET  2  Patient demonstrates at most 6 line difference with dynamic visual acuity testing  NOT TESTED  3  Patient improves FGA to at least 20/30  MET  LONG-TERM GOALS: by 2/20/23  1  Patient increases FGA to 22/30  MET  2  Patient denies vertigo with return to all prior movements  MET      Precautions - none    Specialty Treatment Diary  12/21/22     Home exercise program Seated VOR x 1 horizontal  15 sec x 4 calibrating on "retinal slip"  Walking horizontal head movements in bernardo at home, 15 sec x 4     (Ther-ex, neuromuscular re-ed)Walking/endurance      (Neuromuscular re-ed) Static and dynamic balance/anticipatory / reactive      (Ther-ex) Strengthening      (There-ex) Stretching              PLAN OF CARE: Discharge from physical therapy

## 2023-02-09 ENCOUNTER — APPOINTMENT (OUTPATIENT)
Dept: PHYSICAL THERAPY | Facility: REHABILITATION | Age: 73
End: 2023-02-09

## 2023-02-16 ENCOUNTER — APPOINTMENT (OUTPATIENT)
Dept: PHYSICAL THERAPY | Facility: REHABILITATION | Age: 73
End: 2023-02-16

## 2023-07-26 ENCOUNTER — OFFICE VISIT (OUTPATIENT)
Dept: PODIATRY | Facility: CLINIC | Age: 73
End: 2023-07-26
Payer: MEDICARE

## 2023-07-26 VITALS
BODY MASS INDEX: 20.96 KG/M2 | WEIGHT: 104 LBS | HEIGHT: 59 IN | RESPIRATION RATE: 18 BRPM | SYSTOLIC BLOOD PRESSURE: 125 MMHG | HEART RATE: 69 BPM | DIASTOLIC BLOOD PRESSURE: 77 MMHG

## 2023-07-26 DIAGNOSIS — R23.4 FISSURE IN SKIN OF BOTH FEET: ICD-10-CM

## 2023-07-26 DIAGNOSIS — I87.2 VENOUS INSUFFICIENCY: Primary | ICD-10-CM

## 2023-07-26 DIAGNOSIS — M77.9 TENDONITIS: ICD-10-CM

## 2023-07-26 PROCEDURE — 99212 OFFICE O/P EST SF 10 MIN: CPT | Performed by: PODIATRIST

## 2023-07-26 NOTE — PROGRESS NOTES
Patient presents with multiple mild pedal issues. Patient notes that she has a fissure that is developed on the left heel. It is not painful today but sometimes it is deep and very painful. Second concern involves intermittent swelling that she has in each ankle. The swelling is worse in the morning and increases as the day goes on. Presently, minimal edema is noted. Lastly, at times she has pain along the long extensor tendon to the right hallux. This time that is not painful today. On exam, pedal pulses are within normal limits. EHL tendon to each great toe is both strong and tight. Heel fissuring noted left foot. Treatment: Explained to patient that she is dealing with tendinitis of the EHL tendon. Recommended Voltaren gel when it flares. Also to note which shoe seems to exacerbate the problem. Also explained to patient that she is dealing with venous insufficiency. Advised her to elevate feet and legs is much as possible and utilize compression stockings. Lastly, recommended bag balm for nocturnal use for the fissured heel and dried skin.   Reappoint as needed

## 2023-09-12 ENCOUNTER — HOSPITAL ENCOUNTER (OUTPATIENT)
Dept: MAMMOGRAPHY | Facility: MEDICAL CENTER | Age: 73
Discharge: HOME | End: 2023-09-12
Payer: MEDICARE

## 2023-09-12 VITALS — BODY MASS INDEX: 20.98 KG/M2 | WEIGHT: 104.06 LBS | HEIGHT: 59 IN

## 2023-09-12 DIAGNOSIS — Z12.31 ENCOUNTER FOR SCREENING MAMMOGRAM FOR MALIGNANT NEOPLASM OF BREAST: ICD-10-CM

## 2023-09-12 PROCEDURE — 77067 SCR MAMMO BI INCL CAD: CPT

## 2023-09-12 PROCEDURE — 77063 BREAST TOMOSYNTHESIS BI: CPT

## 2024-02-21 ENCOUNTER — OFFICE VISIT (OUTPATIENT)
Dept: PODIATRY | Facility: CLINIC | Age: 74
End: 2024-02-21
Payer: MEDICARE

## 2024-02-21 VITALS — HEART RATE: 64 BPM | SYSTOLIC BLOOD PRESSURE: 124 MMHG | DIASTOLIC BLOOD PRESSURE: 77 MMHG

## 2024-02-21 DIAGNOSIS — M67.471 GANGLION CYST OF RIGHT FOOT: Primary | ICD-10-CM

## 2024-02-21 PROCEDURE — 99212 OFFICE O/P EST SF 10 MIN: CPT | Performed by: PODIATRIST

## 2024-02-21 NOTE — PROGRESS NOTES
Patient presents for assessment of right foot.  The patient notes that approximately 6 weeks ago she had a lump on the top of the right foot.  This lump has disappeared over the past few weeks and patient is now asymptomatic.  The lump has been painful.    On exam, pedal pulses are within normal limits.  No evidence of exostosis at the first metatarsocuneiform joint of the right foot which is in the region of where the lump appeared.  In the past patient has had a ganglion cyst in this area and is likely that it recurred but then burst.  No treatment is needed at this time.  Reappoint as needed

## 2024-06-13 ENCOUNTER — EVALUATION (OUTPATIENT)
Dept: PHYSICAL THERAPY | Facility: REHABILITATION | Age: 74
End: 2024-06-13
Payer: MEDICARE

## 2024-06-13 DIAGNOSIS — M54.2 CERVICAL SPINE PAIN: Primary | ICD-10-CM

## 2024-06-13 PROCEDURE — 97161 PT EVAL LOW COMPLEX 20 MIN: CPT | Performed by: PHYSICAL THERAPIST

## 2024-06-13 PROCEDURE — 97140 MANUAL THERAPY 1/> REGIONS: CPT | Performed by: PHYSICAL THERAPIST

## 2024-06-13 NOTE — PROGRESS NOTES
PT Evaluation     Today's date: 2024  Patient name: Nishi Arevalo  : 1950  MRN: [id number]  Referring provider: Garrison Mora DO  Dx:   Encounter Diagnosis     ICD-10-CM    1. Cervical spine pain  M54.2           Start Time: 1620  Stop Time: 1700  Total time in clinic (min): 40 minutes    Assessment  Impairments: abnormal or restricted ROM, impaired physical strength, lacks appropriate home exercise program, pain with function and poor posture     Assessment details: Patient is a 74 y.o. female that presents with cervical spine pain.  Patient presents with decreased strength, decreased ROM, and postural abnormalities.  Patient has difficulty with turning her head for driving, looking upward, and activities such as reading secondary to impairments.  Patient would benefit from skilled physical therapy services to address impairments to maximize function.      Understanding of Dx/Px/POC: good    Goals  Impairment:  1.  Patient will reports 50% reduction in pain in 4 weeks to maximize function.  2.  Patient will improve strength to 4/5 in all planes to maximize function.  3.  Patient will improve ROM to WFL in 4 weeks to maximize function.    Functional:  1. Patient will improve FOTO by 13 points to 63/100 in 4 weeks to maximize function.  2. Patient will be independent with HEP in 4 weeks to maximize function.  3. Patient will report no difficulty with turning her head for driving in 4 weeks to maximize function.            Plan  Patient would benefit from: skilled physical therapy  Planned modality interventions: thermotherapy: hydrocollator packs and cryotherapy    Planned therapy interventions: manual therapy, joint mobilization, neuromuscular re-education, nerve gliding, patient/caregiver education, postural training, therapeutic activities, therapeutic exercise, therapeutic training, strengthening, stretching, functional ROM exercises and home exercise program    Frequency: 2x week  Duration  in weeks: 4  Treatment plan discussed with: patient  Plan details: Patient will be a RE in 4 weeks.        Subjective Evaluation    History of Present Illness  Mechanism of injury: Patient presents with cervical spine pain that has been bothersome for about 3 years, but worsened over the last 3-4 weeks.  Patient started prednisone last Tuesday and ended on .  She was having symptoms down both her arms that has improved with the prednisone.  She has been having lower back pain with bilateral sciatica for many years.  She will be having a cortisone injection tomorrow for her lower back.  Patient reports difficulty with turning her head for driving, looking upward, and activities such as reading.  She can get dizziness at times and get nausea.  This has not occurred since the prednisone.  She will get weakness into her UE, but has not had this since the prednisone.  She can get headaches from her symptoms.  Her current symptoms radiate into her shoulders.  Patient feels her symptoms may have started after gardening.    Patient Goals  Patient goals for therapy: decreased pain, increased motion and increased strength    Pain  At best pain ratin  At worst pain rating: 10  Location: cervical spine  Quality: dull ache  Relieving factors: medications and heat  Progression: improved    Hand dominance: right      Diagnostic Tests  X-ray: abnormal (degenerative changes)  Treatments  Current treatment: physical therapy        Objective     Static Posture     Head  Forward.    Shoulders  Rounded.    Palpation   Left   No palpable tenderness to the levator scapulae, lower trapezius, middle trapezius, rhomboids and upper trapezius.     Right   No palpable tenderness to the levator scapulae, lower trapezius, middle trapezius, rhomboids and upper trapezius.     Tenderness   Cervical Spine   Tenderness in the spinous process (T5-9).     Active Range of Motion   Cervical/Thoracic Spine       Cervical    Flexion: 36 degrees   with pain  Extension: 38 degrees     with pain  Left lateral flexion: 25 degrees     with pain  Right lateral flexion: 30 degrees      Left rotation: 55 degrees with pain  Right rotation: 53 degrees    with pain  Left Shoulder   Flexion: WFL    Right Shoulder   Flexion: WFL    Passive Range of Motion   Cervical/Thoracic Spine   Cervical     Left lateral flexion (degrees):  Restriction level: minimal  Right lateral flexion (degrees):  Restriction level: minimal  Left rotation (degrees):  Restriction level: minimal  Right rotation (degrees):  Restriction level: minimal    Joint Play   Joints within functional limits: C2, C3 and C4     Hypomobile: C5, C6 and C7     Strength/Myotome Testing     Left Shoulder     Planes of Motion   Flexion: 4     Right Shoulder     Planes of Motion   Flexion: 4              Precautions: none      Manuals 6/13            Manual cervical traction 5 min            Downgliding lower c-spine 3 min            Cervical PROM                          Neuro Re-Ed             Slump correct             Scapular retraction             Row with band             Shoulder extension with band             Supine serratus punches                                        Ther Ex             UBE rev                                                                                                        Ther Activity                                       Gait Training                                       Modalities             PRN

## 2024-06-13 NOTE — LETTER
2024    Garrison Mora DO  [address] [place] [address]    Patient: Nishi Arevalo   YOB: 1950   Date of Visit: 2024     Encounter Diagnosis     ICD-10-CM    1. Cervical spine pain  M54.2           Dear Dr. Mora:    Thank you for your recent referral of Nishi Arevalo. Please review the attached evaluation summary from Nishi's recent visit.     Please verify that you agree with the plan of care by signing the attached order.     If you have any questions or concerns, please do not hesitate to call.     I sincerely appreciate the opportunity to share in the care of one of your patients and hope to have another opportunity to work with you in the near future.       Sincerely,    Ryan Dubon, PT      Referring Provider:      I certify that I have read the below Plan of Care and certify the need for these services furnished under this plan of treatment while under my care.                    Garrison Mora DO  [address] [place] [address]  Via Fax: [phone]          PT Evaluation     Today's date: 2024  Patient name: Nishi Arevalo  : 1950  MRN: [id number]  Referring provider: Garrison Mora DO  Dx:   Encounter Diagnosis     ICD-10-CM    1. Cervical spine pain  M54.2           Start Time: 1620  Stop Time: 1700  Total time in clinic (min): 40 minutes    Assessment  Impairments: abnormal or restricted ROM, impaired physical strength, lacks appropriate home exercise program, pain with function and poor posture     Assessment details: Patient is a 74 y.o. female that presents with cervical spine pain.  Patient presents with decreased strength, decreased ROM, and postural abnormalities.  Patient has difficulty with turning her head for driving, looking upward, and activities such as reading secondary to impairments.  Patient would benefit from skilled physical therapy services to address impairments to maximize  function.      Understanding of Dx/Px/POC: good    Goals  Impairment:  1.  Patient will reports 50% reduction in pain in 4 weeks to maximize function.  2.  Patient will improve strength to 4/5 in all planes to maximize function.  3.  Patient will improve ROM to WFL in 4 weeks to maximize function.    Functional:  1. Patient will improve FOTO by 13 points to 63/100 in 4 weeks to maximize function.  2. Patient will be independent with HEP in 4 weeks to maximize function.  3. Patient will report no difficulty with turning her head for driving in 4 weeks to maximize function.            Plan  Patient would benefit from: skilled physical therapy  Planned modality interventions: thermotherapy: hydrocollator packs and cryotherapy    Planned therapy interventions: manual therapy, joint mobilization, neuromuscular re-education, nerve gliding, patient/caregiver education, postural training, therapeutic activities, therapeutic exercise, therapeutic training, strengthening, stretching, functional ROM exercises and home exercise program    Frequency: 2x week  Duration in weeks: 4  Treatment plan discussed with: patient  Plan details: Patient will be a RE in 4 weeks.        Subjective Evaluation    History of Present Illness  Mechanism of injury: Patient presents with cervical spine pain that has been bothersome for about 3 years, but worsened over the last 3-4 weeks.  Patient started prednisone last Tuesday and ended on Sunday.  She was having symptoms down both her arms that has improved with the prednisone.  She has been having lower back pain with bilateral sciatica for many years.  She will be having a cortisone injection tomorrow for her lower back.  Patient reports difficulty with turning her head for driving, looking upward, and activities such as reading.  She can get dizziness at times and get nausea.  This has not occurred since the prednisone.  She will get weakness into her UE, but has not had this since the  prednisone.  She can get headaches from her symptoms.  Her current symptoms radiate into her shoulders.  Patient feels her symptoms may have started after gardening.    Patient Goals  Patient goals for therapy: decreased pain, increased motion and increased strength    Pain  At best pain ratin  At worst pain rating: 10  Location: cervical spine  Quality: dull ache  Relieving factors: medications and heat  Progression: improved    Hand dominance: right      Diagnostic Tests  X-ray: abnormal (degenerative changes)  Treatments  Current treatment: physical therapy        Objective     Static Posture     Head  Forward.    Shoulders  Rounded.    Palpation   Left   No palpable tenderness to the levator scapulae, lower trapezius, middle trapezius, rhomboids and upper trapezius.     Right   No palpable tenderness to the levator scapulae, lower trapezius, middle trapezius, rhomboids and upper trapezius.     Tenderness   Cervical Spine   Tenderness in the spinous process (T5-9).     Active Range of Motion   Cervical/Thoracic Spine       Cervical    Flexion: 36 degrees  with pain  Extension: 38 degrees     with pain  Left lateral flexion: 25 degrees     with pain  Right lateral flexion: 30 degrees      Left rotation: 55 degrees with pain  Right rotation: 53 degrees    with pain  Left Shoulder   Flexion: WFL    Right Shoulder   Flexion: WFL    Passive Range of Motion   Cervical/Thoracic Spine   Cervical     Left lateral flexion (degrees):  Restriction level: minimal  Right lateral flexion (degrees):  Restriction level: minimal  Left rotation (degrees):  Restriction level: minimal  Right rotation (degrees):  Restriction level: minimal    Joint Play   Joints within functional limits: C2, C3 and C4     Hypomobile: C5, C6 and C7     Strength/Myotome Testing     Left Shoulder     Planes of Motion   Flexion: 4     Right Shoulder     Planes of Motion   Flexion: 4              Precautions: none      Manuals             Manual  cervical traction 5 min            Downgliding lower c-spine 3 min            Cervical PROM                          Neuro Re-Ed             Slump correct             Scapular retraction             Row with band             Shoulder extension with band             Supine serratus punches                                        Ther Ex             UBE rev                                                                                                        Ther Activity                                       Gait Training                                       Modalities             PRN

## 2024-06-18 ENCOUNTER — OFFICE VISIT (OUTPATIENT)
Dept: PHYSICAL THERAPY | Facility: REHABILITATION | Age: 74
End: 2024-06-18
Payer: MEDICARE

## 2024-06-18 DIAGNOSIS — M54.2 CERVICAL SPINE PAIN: Primary | ICD-10-CM

## 2024-06-18 PROCEDURE — 97112 NEUROMUSCULAR REEDUCATION: CPT

## 2024-06-18 PROCEDURE — 97140 MANUAL THERAPY 1/> REGIONS: CPT

## 2024-06-18 PROCEDURE — 97110 THERAPEUTIC EXERCISES: CPT

## 2024-06-18 NOTE — PROGRESS NOTES
"Daily Note     Today's date: 2024  Patient name: Nishi Arevalo  : 1950  MRN: [id number]  Referring provider: Garrison Mora DO  Dx:   Encounter Diagnosis     ICD-10-CM    1. Cervical spine pain  M54.2           Start Time: 1200  Stop Time: 1240  Total time in clinic (min): 40 minutes    Subjective: Pt reports no significant soreness following the evaluation.  Pt notes attempting to be more aware of her position to limit aggravating positions.        Objective: See treatment diary below    Precautions: none      Manuals            Manual cervical traction 5 min 5 min           Downgliding lower c-spine 3 min 3 min           Cervical PROM  5 min                        Neuro Re-Ed             Slump correct  5\"x10           Scapular retraction  5\"x10           Row with band  nv           Shoulder extension with band  nv           Supine serratus punches   nv                                     Ther Ex             UBE rev  3 min                                                                                                      Ther Activity                                       Gait Training                                       Modalities             PRN                            Assessment: Pt presents to PT for first visit since initial evaluation.  Initiated TE as noted per PT POC.  Tolerated treatment well. Tenderness on R with down gliding.  Cueing provided to perform slump correct and seated scap retractions limiting UT compensation.  Pt was educated in possible soreness and was issued updated HEP.  Assess response to treatment NV and progress as able. Patient would benefit from continued PT      Plan: Progress treatment as tolerated.              "

## 2024-06-20 ENCOUNTER — OFFICE VISIT (OUTPATIENT)
Dept: PHYSICAL THERAPY | Facility: REHABILITATION | Age: 74
End: 2024-06-20
Payer: MEDICARE

## 2024-06-20 DIAGNOSIS — M54.2 CERVICAL SPINE PAIN: Primary | ICD-10-CM

## 2024-06-20 PROCEDURE — 97110 THERAPEUTIC EXERCISES: CPT

## 2024-06-20 PROCEDURE — 97112 NEUROMUSCULAR REEDUCATION: CPT

## 2024-06-24 ENCOUNTER — OFFICE VISIT (OUTPATIENT)
Dept: PHYSICAL THERAPY | Facility: REHABILITATION | Age: 74
End: 2024-06-24
Payer: MEDICARE

## 2024-06-24 DIAGNOSIS — M54.2 CERVICAL SPINE PAIN: Primary | ICD-10-CM

## 2024-06-24 PROCEDURE — 97112 NEUROMUSCULAR REEDUCATION: CPT

## 2024-06-24 PROCEDURE — 97110 THERAPEUTIC EXERCISES: CPT

## 2024-06-24 NOTE — PROGRESS NOTES
"Daily Note     Today's date: 2024  Patient name: Nishi Arevalo  : 1950  MRN: [id number]  Referring provider: Garrison Mora DO  Dx:   Encounter Diagnosis     ICD-10-CM    1. Cervical spine pain  M54.2           Start Time: 1400  Stop Time: 1445  Total time in clinic (min): 45 minutes    Subjective: Pt denies soreness following last visit.  Pt reports the neck \"hasn't been too bad\".  Pt notes it gets stiff, but then works it out and has not noticed any numbness/tingling into L UE over the last few days.         Objective: See treatment diary below    Precautions: none      Manuals          Manual cervical traction 5 min 5 min 5 min 5 min         Downgliding lower c-spine 3 min 3 min 3 min 3 min         Cervical PROM  5 min 5 min 5 min                      Neuro Re-Ed             Slump correct  5\"x10 5\"x10 5\"x10         Scapular retraction  5\"x10 5\"x10 5\"x15         Row with band  nv Yellow 5\"x10 Yellow 5\"x20         Shoulder extension with band  nv Yellow 5\"x10 Yellow 5\"x15         Supine serratus punches   nv 5\"x10 5\"x15                                   Ther Ex             UBE rev  3 min 3 min 4 min                                                                                                    Ther Activity                                       Gait Training                                       Modalities             PRN                            Assessment:  Tolerated treatment well. Pt was progressed with additional repetitions of TE this visit with good tolerance.  Positive response to manual therapy, minimal end range limitations with cervical rotation bilaterally.  Continue to progress as tolerated.  Patient would benefit from continued PT to improve strength, ROM and maximize overall level of function.      Plan: Progress treatment as tolerated.              "

## 2024-06-28 ENCOUNTER — OFFICE VISIT (OUTPATIENT)
Dept: PHYSICAL THERAPY | Facility: REHABILITATION | Age: 74
End: 2024-06-28
Payer: MEDICARE

## 2024-06-28 DIAGNOSIS — M54.2 CERVICAL SPINE PAIN: Primary | ICD-10-CM

## 2024-06-28 PROCEDURE — 97112 NEUROMUSCULAR REEDUCATION: CPT

## 2024-06-28 PROCEDURE — 97110 THERAPEUTIC EXERCISES: CPT

## 2024-06-28 NOTE — PROGRESS NOTES
"Daily Note     Today's date: 2024  Patient name: Nishi Arevalo  : 1950  MRN: [id number]  Referring provider: Garrison Mora DO  Dx:   Encounter Diagnosis     ICD-10-CM    1. Cervical spine pain  M54.2           Start Time: 1030  Stop Time: 1115  Total time in clinic (min): 45 minutes    Subjective: Pt reports mild soreness following last visit for a brief period of time.  Pt notes feeling more achy and stiff yesterday and is unsure why.  Pt suggests weather as a possible cause, but reports neck is much better this AM compared to yesterday.         Objective: See treatment diary below    Precautions: none      Manuals         Manual cervical traction 5 min 5 min 5 min 5 min 5 min        Downgliding lower c-spine 3 min 3 min 3 min 3 min 3 min        Cervical PROM  5 min 5 min 5 min 5 min                     Neuro Re-Ed             Slump correct  5\"x10 5\"x10 5\"x10 5\"x10        Scapular retraction  5\"x10 5\"x10 5\"x15 5\"x15        Row with band  nv Yellow 5\"x10 Yellow 5\"x20 Red 5\"x15        Shoulder extension with band  nv Yellow 5\"x10 Yellow 5\"x15 Red 5\"x15        Supine serratus punches   nv 5\"x10 5\"x15 5\"x15                                  Ther Ex             UBE rev  3 min 3 min 4 min 4 min                                                                                                   Ther Activity                                       Gait Training                                       Modalities             PRN                            Assessment:  Tolerated treatment well. Mild end range limitation with cervical rotation to L with L sided tightness noted.  Progressed resisted row/ext to red band with good tolerance.  Cueing provided to prevent posterior lean with standing exercises.  Patient would benefit from continued PT to improve strength, ROM and maximize overall level of function.      Plan: Progress treatment as tolerated.              "

## 2024-07-01 ENCOUNTER — OFFICE VISIT (OUTPATIENT)
Dept: PHYSICAL THERAPY | Facility: REHABILITATION | Age: 74
End: 2024-07-01
Payer: MEDICARE

## 2024-07-01 DIAGNOSIS — M54.2 CERVICAL SPINE PAIN: Primary | ICD-10-CM

## 2024-07-01 PROCEDURE — 97112 NEUROMUSCULAR REEDUCATION: CPT | Performed by: PHYSICAL THERAPIST

## 2024-07-01 PROCEDURE — 97110 THERAPEUTIC EXERCISES: CPT | Performed by: PHYSICAL THERAPIST

## 2024-07-01 NOTE — PROGRESS NOTES
"Daily Note     Today's date: 2024  Patient name: Nishi Arevalo  : 1950  MRN: [id number]  Referring provider: Garrison Mora DO  Dx:   Encounter Diagnosis     ICD-10-CM    1. Cervical spine pain  M54.2                      Subjective: patient mentions on Saturday she was on her feet for about 6 hours and her neck and back were bothersome, however after resting on  she feels okay. She notes she is noticing improvements in her neck symptoms.      Objective: See treatment diary below      Assessment: Tolerated treatment well. Continued program as noted below. She had no pain or discomfort noted with any manual therapy or provided exercises today. Patient exhibited good technique with therapeutic exercises and would benefit from continued PT      Plan: Continue per plan of care.  Progress treatment as tolerated.       Precautions: none      Manuals        Manual cervical traction 5 min 5 min 5 min 5 min 5 min 5 min       Downgliding lower c-spine 3 min 3 min 3 min 3 min 3 min 3 min       Cervical PROM  5 min 5 min 5 min 5 min 5 min                    Neuro Re-Ed             Slump correct  5\"x10 5\"x10 5\"x10 5\"x10 5\"x10       Scapular retraction  5\"x10 5\"x10 5\"x15 5\"x15 5\"x15       Row with band  nv Yellow 5\"x10 Yellow 5\"x20 Red 5\"x15 Red 5\"x15       Shoulder extension with band  nv Yellow 5\"x10 Yellow 5\"x15 Red 5\"x15 Red 5\"x15       Supine serratus punches   nv 5\"x10 5\"x15 5\"x15 5\"x20                                 Ther Ex             UBE rev  3 min 3 min 4 min 4 min 4 min                                                                                                  Ther Activity                                       Gait Training                                       Modalities             PRN                               "

## 2024-07-05 ENCOUNTER — OFFICE VISIT (OUTPATIENT)
Dept: PHYSICAL THERAPY | Facility: REHABILITATION | Age: 74
End: 2024-07-05
Payer: MEDICARE

## 2024-07-05 DIAGNOSIS — M54.2 CERVICAL SPINE PAIN: Primary | ICD-10-CM

## 2024-07-05 PROCEDURE — 97110 THERAPEUTIC EXERCISES: CPT | Performed by: PHYSICAL THERAPIST

## 2024-07-05 PROCEDURE — 97112 NEUROMUSCULAR REEDUCATION: CPT | Performed by: PHYSICAL THERAPIST

## 2024-07-05 PROCEDURE — 97140 MANUAL THERAPY 1/> REGIONS: CPT | Performed by: PHYSICAL THERAPIST

## 2024-07-05 NOTE — PROGRESS NOTES
"Daily Note     Today's date: 2024  Patient name: Nishi Arevalo  : 1950  MRN: [id number]  Referring provider: Garrison Mora DO  Dx:   Encounter Diagnosis     ICD-10-CM    1. Cervical spine pain  M54.2           Start Time: 948  Stop Time: 1030  Total time in clinic (min): 42 minutes    Subjective: Patient repots that she is doing well overall.       Objective: See treatment diary below      Assessment: Tolerated treatment well. Patient presents with slight limitation with left rotation compared to right.  Mild restriction with downgliding on left compared to right.  Patient exhibited good technique with therapeutic exercises and would benefit from continued PT.  Patient is progressed with resistance for several exercises with good tolerance.  Continue to progress as able.       Plan: Continue per plan of care.  Progress treatment as tolerated.       Precautions: none      Manuals  7      Manual cervical traction 5 min 5 min 5 min 5 min 5 min 5 min 5 min      Downgliding lower c-spine 3 min 3 min 3 min 3 min 3 min 3 min 4 min      Cervical PROM  5 min 5 min 5 min 5 min 5 min 5 min      Rotational mobs wayne       4 min      Neuro Re-Ed             Slump correct  5\"x10 5\"x10 5\"x10 5\"x10 5\"x10       Scapular retraction  5\"x10 5\"x10 5\"x15 5\"x15 5\"x15       Row with band  nv Yellow 5\"x10 Yellow 5\"x20 Red 5\"x15 Red 5\"x15 Green 5\" 15x      Shoulder extension with band  nv Yellow 5\"x10 Yellow 5\"x15 Red 5\"x15 Red 5\"x15 Red 5\" 15x      Supine serratus punches   nv 5\"x10 5\"x15 5\"x15 5\"x20 5\" 20x 3 lbs                                Ther Ex             UBE rev  3 min 3 min 4 min 4 min 4 min 4 min                                                                                                 Ther Activity                                       Gait Training                                       Modalities             PRN                               "

## 2024-07-08 ENCOUNTER — TELEPHONE (OUTPATIENT)
Age: 74
End: 2024-07-08

## 2024-07-08 NOTE — TELEPHONE ENCOUNTER
Hello,    Please advise if a forced appointment can be accommodated for the patient:    Call back #: [phone]    Insurance: Medicare/[employer]    Reason for appointment: toe injury/dog stepped on grt toe/was bleeding/blood under nail/wants Dr to check it/please return call and advise how to treat/schedule    Requested doctor and/or location:Dr. Archer/ [facility]      Thank you.

## 2024-07-09 ENCOUNTER — OFFICE VISIT (OUTPATIENT)
Dept: PHYSICAL THERAPY | Facility: REHABILITATION | Age: 74
End: 2024-07-09
Payer: MEDICARE

## 2024-07-09 DIAGNOSIS — M54.2 CERVICAL SPINE PAIN: Primary | ICD-10-CM

## 2024-07-09 PROCEDURE — 97110 THERAPEUTIC EXERCISES: CPT

## 2024-07-09 PROCEDURE — 97112 NEUROMUSCULAR REEDUCATION: CPT

## 2024-07-09 PROCEDURE — 97140 MANUAL THERAPY 1/> REGIONS: CPT

## 2024-07-09 NOTE — PROGRESS NOTES
"Daily Note     Today's date: 2024  Patient name: Nishi Arevalo  : 1950  MRN: [id number]  Referring provider: Garrison Mora DO  Dx:   Encounter Diagnosis     ICD-10-CM    1. Cervical spine pain  M54.2           Start Time: 1530  Stop Time: 1615  Total time in clinic (min): 45 minutes    Subjective: Patient reports the neck \"was a little iffy\" and woke with a headache this AM.  Pt attributes symptoms to weather changes and sitting at a play this weekend.  Pt notes overall neck symptoms have been improved and feels about 90% improved since beginning therapy.          Objective: See treatment diary below    Precautions: none      Manuals      Manual cervical traction 5 min 5 min 5 min 5 min 5 min 5 min 5 min 5 min     Downgliding lower c-spine 3 min 3 min 3 min 3 min 3 min 3 min 4 min 3 min     Cervical PROM  5 min 5 min 5 min 5 min 5 min 5 min 5 min     Rotational mobs wayne       4 min 4 min     Neuro Re-Ed             Slump correct  5\"x10 5\"x10 5\"x10 5\"x10 5\"x10       Scapular retraction  5\"x10 5\"x10 5\"x15 5\"x15 5\"x15       Row with band  nv Yellow 5\"x10 Yellow 5\"x20 Red 5\"x15 Red 5\"x15 Green 5\" 15x Green 5\"x20     Shoulder extension with band  nv Yellow 5\"x10 Yellow 5\"x15 Red 5\"x15 Red 5\"x15 Red 5\" 15x Red 5\"x20     Supine serratus punches   nv 5\"x10 5\"x15 5\"x15 5\"x20 5\" 20x 3 lbs 3# 5\"x20                                Ther Ex             UBE rev  3 min 3 min 4 min 4 min 4 min 4 min 4 min                                                                                                Ther Activity                                       Gait Training                                       Modalities             PRN                            Assessment: Tolerated treatment well. Rotational mobs performed by Ryan Dubon DPT with mild restrictions bilaterally.  Mild ROM limitations with rotation to L compared to R.  Pt was progressed with TE as noted.  Plan to " RE next visit.  Patient exhibited good technique with therapeutic exercises.        Plan:  Plan to RE next visit with probable DC at that time to transition to HEP.

## 2024-07-10 ENCOUNTER — OFFICE VISIT (OUTPATIENT)
Dept: PODIATRY | Facility: CLINIC | Age: 74
End: 2024-07-10
Payer: MEDICARE

## 2024-07-10 VITALS — DIASTOLIC BLOOD PRESSURE: 72 MMHG | SYSTOLIC BLOOD PRESSURE: 123 MMHG | HEART RATE: 69 BPM

## 2024-07-10 DIAGNOSIS — M79.675 PAIN IN TOE OF LEFT FOOT: ICD-10-CM

## 2024-07-10 DIAGNOSIS — L60.1 ONYCHOLYSIS: Primary | ICD-10-CM

## 2024-07-10 PROCEDURE — 99214 OFFICE O/P EST MOD 30 MIN: CPT | Performed by: PODIATRIST

## 2024-07-10 RX ORDER — DOXYCYCLINE HYCLATE 50 MG/1
50 CAPSULE ORAL 2 TIMES DAILY
Qty: 20 CAPSULE | Refills: 0 | Status: SHIPPED | OUTPATIENT
Start: 2024-07-10 | End: 2024-07-20

## 2024-07-10 NOTE — PROGRESS NOTES
Ambulatory Visit  Name: Nishi Arevalo      : 1950      MRN: [id number]  Encounter Provider: Garrison Archer DPM  Encounter Date: 7/10/2024   Encounter department: [facility] [place]    Explained to patient that she is dealing with onycholysis.  The left great toenail has detached medially at the level of the eponychium.  There is a mild infection present.  Patient was advised to continue with topical antibiotic.  Also prescribed doxycycline 50 mg twice daily for 10 days.  No need to remove nail with current symptoms.  She will be reassessed in 3 weeks.    Assessment & Plan   1. Onycholysis  -     doxycycline hyclate (VIBRAMYCIN) 50 mg capsule; Take 1 capsule (50 mg total) by mouth 2 (two) times a day for 10 days  2. Pain in toe of left foot      History of Present Illness     Nishi Arevalo is a 74 y.o. female who presents with concern regarding her left great toe.  On , she was stepped on by a dog.  This damaged her nail and led to significant bleeding, redness and pain.  The patient has been applying topical antibiotics since the injury.  The toe is still erythematous and the nail seems detached from the nailbed at the level of the cuticle.  Minor discomfort persists.  The patient is able to wear shoes.    Review of Systems   Musculoskeletal: Negative.    Psychiatric/Behavioral: Negative.             Objective     /72 (BP Location: Right arm, Patient Position: Sitting, Cuff Size: Standard)   Pulse 69     Physical Exam  Constitutional:       Appearance: Normal appearance.   Cardiovascular:      Pulses: Normal pulses.   Musculoskeletal:         General: Normal range of motion.   Skin:     Findings: Erythema present.      Comments: Left great toenail is detached medially at the level of the eponychium.  There is no drainage at this time.  There is discomfort with direct palpation.   Neurological:      General: No focal deficit present.      Mental Status: She is  oriented to person, place, and time.           Administrative Statements

## 2024-07-12 ENCOUNTER — EVALUATION (OUTPATIENT)
Dept: PHYSICAL THERAPY | Facility: REHABILITATION | Age: 74
End: 2024-07-12
Payer: MEDICARE

## 2024-07-12 DIAGNOSIS — M54.2 CERVICAL SPINE PAIN: Primary | ICD-10-CM

## 2024-07-12 PROCEDURE — 97140 MANUAL THERAPY 1/> REGIONS: CPT | Performed by: PHYSICAL THERAPIST

## 2024-07-12 PROCEDURE — 97110 THERAPEUTIC EXERCISES: CPT | Performed by: PHYSICAL THERAPIST

## 2024-07-12 NOTE — PROGRESS NOTES
PT Discharge    Today's date: 2024  Patient name: Nishi Arevalo  : 1950  MRN: [id number]  Referring provider: Garrison Mora DO  Dx:   Encounter Diagnosis     ICD-10-CM    1. Cervical spine pain  M54.2           Start Time: 1035  Stop Time: 1115  Total time in clinic (min): 40 minutes    Assessment  Impairments: abnormal or restricted ROM, impaired physical strength, lacks appropriate home exercise program, pain with function and poor posture     Assessment details: Patient is a 74 y.o. female that presents with cervical spine pain.  Patient reports improvement with skilled physical therapy services.  Patient's FOTO improved by 2 points to 52/100.  Patient reports improvement with driving, looking upward, and activities such as reading.  Patient reports continued difficulty with pain at times dependent on activity.  Patient has made good progress towards goals established for physical therapy.  Patient would benefit from HEP for continued strengthening, ROM, and postural re-education.  Discharged to [facility] at this time.         Understanding of Dx/Px/POC: good    Goals  Impairment:  1.  Patient will reports 50% reduction in pain in 4 weeks to maximize function.-MET  2.  Patient will improve strength to 4/5 in all planes to maximize function.-PARTIALLY MET  3.  Patient will improve ROM to WFL in 4 weeks to maximize function.-PARTIALLY MET    Functional:  1. Patient will improve FOTO by 13 points to 63/100 in 4 weeks to maximize function.-PARTIALLY MET  2. Patient will be independent with HEP in 4 weeks to maximize function.-MET  3. Patient will report no difficulty with turning her head for driving in 4 weeks to maximize function.-MET            Plan  Planned modality interventions: thermotherapy: hydrocollator packs and cryotherapy    Planned therapy interventions: manual therapy, joint mobilization, neuromuscular re-education, nerve gliding, patient/caregiver education, postural training,  therapeutic activities, therapeutic exercise, therapeutic training, strengthening, stretching, functional ROM exercises and home exercise program    Treatment plan discussed with: patient  Plan details: Patient will be discharged to [facility] at this time.         Subjective Evaluation    History of Present Illness  Mechanism of injury: Patient presents with cervical spine pain that has been bothersome for about 3 years, but worsened over the last 3-4 weeks.  Patient started prednisone last Tuesday and ended on .  She was having symptoms down both her arms that has improved with the prednisone.  She has been having lower back pain with bilateral sciatica for many years.  She will be having a cortisone injection tomorrow for her lower back.  Patient reports difficulty with turning her head for driving, looking upward, and activities such as reading.  She can get dizziness at times and get nausea.  This has not occurred since the prednisone.  She will get weakness into her UE, but has not had this since the prednisone.  She can get headaches from her symptoms.  Her current symptoms radiate into her shoulders.  Patient feels her symptoms may have started after gardening.    Patient Goals  Patient goals for therapy: decreased pain, increased motion and increased strength    Pain  At best pain ratin  At worst pain ratin  Location: cervical spine  Quality: dull ache  Relieving factors: medications and heat  Progression: improved    Hand dominance: right      Diagnostic Tests  X-ray: abnormal (degenerative changes)  Treatments  Current treatment: physical therapy        Objective     Static Posture     Head  Forward.    Shoulders  Rounded.    Palpation   Left   No palpable tenderness to the levator scapulae, lower trapezius, middle trapezius, rhomboids and upper trapezius.     Right   No palpable tenderness to the levator scapulae, lower trapezius, middle trapezius, rhomboids and upper trapezius.     Tenderness  "  Cervical Spine   No tenderness in the spinous process.     Active Range of Motion   Cervical/Thoracic Spine       Cervical    Flexion: 54 degrees   Extension: 48 degrees      Left lateral flexion: 35 degrees      Right lateral flexion: 35 degrees      Left rotation: 63 degrees  Right rotation: 58 degrees    with pain  Left Shoulder   Flexion: WFL    Right Shoulder   Flexion: WFL    Passive Range of Motion   Cervical/Thoracic Spine   Cervical     Left lateral flexion (degrees):  WFL  Right lateral flexion (degrees):  WFL  Left rotation (degrees):  WFL  Right rotation (degrees):  WFL    Joint Play   Joints within functional limits: C2, C3, C4, C5, C6 and C7     Strength/Myotome Testing     Left Shoulder     Planes of Motion   Flexion: 4     Right Shoulder     Planes of Motion   Flexion: 4       Flowsheet Rows      Flowsheet Row Most Recent Value   PT/OT G-Codes    Current Score 50   Projected Score 63             Precautions: none        Manuals 6/13 6/18 6/20 6/24 6/28 7/1 7/5 7/9 7/12     Manual cervical traction 5 min 5 min 5 min 5 min 5 min 5 min 5 min 5 min  5 min     Downgliding lower c-spine 3 min 3 min 3 min 3 min 3 min 3 min 4 min 3 min  3 min     Cervical PROM   5 min 5 min 5 min 5 min 5 min 5 min 5 min  5 min     Rotational mobs wayne             4 min 4 min  2 min     measurements         20 min    Neuro Re-Ed                       Slump correct   5\"x10 5\"x10 5\"x10 5\"x10 5\"x10           Scapular retraction   5\"x10 5\"x10 5\"x15 5\"x15 5\"x15           Row with band   nv Yellow 5\"x10 Yellow 5\"x20 Red 5\"x15 Red 5\"x15 Green 5\" 15x Green 5\"x20       Shoulder extension with band   nv Yellow 5\"x10 Yellow 5\"x15 Red 5\"x15 Red 5\"x15 Red 5\" 15x Red 5\"x20       Supine serratus punches    nv 5\"x10 5\"x15 5\"x15 5\"x20 5\" 20x 3 lbs 3# 5\"x20                                                        Ther Ex                       UBE rev   3 min 3 min 4 min 4 min 4 min 4 min 4 min  4 min                                             "                                                                                                                                 Ther Activity                                                                       Gait Training                                                                       Modalities                       PRN

## 2024-07-31 ENCOUNTER — OFFICE VISIT (OUTPATIENT)
Dept: PODIATRY | Facility: CLINIC | Age: 74
End: 2024-07-31
Payer: MEDICARE

## 2024-07-31 VITALS — WEIGHT: 106 LBS | BODY MASS INDEX: 21.37 KG/M2 | HEIGHT: 59 IN | RESPIRATION RATE: 18 BRPM

## 2024-07-31 DIAGNOSIS — M79.675 PAIN IN TOE OF LEFT FOOT: ICD-10-CM

## 2024-07-31 DIAGNOSIS — L60.1 ONYCHOLYSIS: Primary | ICD-10-CM

## 2024-07-31 PROCEDURE — 99212 OFFICE O/P EST SF 10 MIN: CPT | Performed by: PODIATRIST

## 2024-07-31 NOTE — PROGRESS NOTES
Patient presents for assessment of left great toenail.  It has healed uneventfully and patient is comfortable.  No need for Band-Aid at this time.    Patient states that she injured her right fourth and fifth toes 2 weeks ago.  She is taping them together for fear of fracture.  There is no pain with palpation of the right fifth or fourth toes nor is there edema.  Patient told that she may discontinue taping the toes together.    Reappoint as needed

## 2024-10-18 ENCOUNTER — HOSPITAL ENCOUNTER (OUTPATIENT)
Dept: MAMMOGRAPHY | Facility: MEDICAL CENTER | Age: 74
Discharge: HOME | End: 2024-10-18
Payer: MEDICARE

## 2024-10-18 VITALS — WEIGHT: 106 LBS | BODY MASS INDEX: 21.37 KG/M2 | HEIGHT: 59 IN

## 2024-10-18 DIAGNOSIS — Z12.31 ENCOUNTER FOR SCREENING MAMMOGRAM FOR MALIGNANT NEOPLASM OF BREAST: ICD-10-CM

## 2024-10-18 PROCEDURE — 77067 SCR MAMMO BI INCL CAD: CPT

## 2024-10-18 PROCEDURE — 77063 BREAST TOMOSYNTHESIS BI: CPT

## 2024-11-20 ENCOUNTER — OFFICE VISIT (OUTPATIENT)
Dept: PODIATRY | Facility: CLINIC | Age: 74
End: 2024-11-20
Payer: MEDICARE

## 2024-11-20 VITALS — HEIGHT: 59 IN | RESPIRATION RATE: 18 BRPM | BODY MASS INDEX: 21.37 KG/M2 | WEIGHT: 106 LBS

## 2024-11-20 DIAGNOSIS — L60.1 ONYCHOLYSIS: Primary | ICD-10-CM

## 2024-11-20 PROCEDURE — 99212 OFFICE O/P EST SF 10 MIN: CPT | Performed by: PODIATRIST

## 2024-11-20 NOTE — PROGRESS NOTES
Patient again presents with onycholysis of the left hallux nail plate.  This nail was attached from the nailbed and prone to being caught in shoes and socks.    Treatment consisted of nail trimming.  Patient is scheduled for back surgery in the next few days and aggressive nail removal not advised.  It is hoped that the nail will grow out and not be lifted off the nailbed in the future.  Reappoint as needed